# Patient Record
Sex: MALE | Race: WHITE | Employment: FULL TIME | URBAN - METROPOLITAN AREA
[De-identification: names, ages, dates, MRNs, and addresses within clinical notes are randomized per-mention and may not be internally consistent; named-entity substitution may affect disease eponyms.]

---

## 2020-02-06 ENCOUNTER — OFFICE VISIT (OUTPATIENT)
Dept: FAMILY MEDICINE CLINIC | Age: 60
End: 2020-02-06
Payer: COMMERCIAL

## 2020-02-06 VITALS
DIASTOLIC BLOOD PRESSURE: 90 MMHG | SYSTOLIC BLOOD PRESSURE: 148 MMHG | HEART RATE: 99 BPM | TEMPERATURE: 97 F | OXYGEN SATURATION: 99 % | BODY MASS INDEX: 33.98 KG/M2 | HEIGHT: 73 IN | WEIGHT: 256.4 LBS

## 2020-02-06 PROCEDURE — 99203 OFFICE O/P NEW LOW 30 MIN: CPT | Performed by: FAMILY MEDICINE

## 2020-02-06 RX ORDER — PREDNISONE 20 MG/1
20 TABLET ORAL 2 TIMES DAILY
Qty: 10 TABLET | Refills: 0 | Status: SHIPPED | OUTPATIENT
Start: 2020-02-06 | End: 2020-02-11

## 2020-02-06 RX ORDER — OMEGA-3 FATTY ACIDS/FISH OIL 300-1000MG
1 CAPSULE ORAL
Status: ON HOLD | COMMUNITY
End: 2022-09-21 | Stop reason: HOSPADM

## 2020-02-06 SDOH — HEALTH STABILITY: MENTAL HEALTH: HOW OFTEN DO YOU HAVE A DRINK CONTAINING ALCOHOL?: MONTHLY OR LESS

## 2020-02-06 ASSESSMENT — ENCOUNTER SYMPTOMS
VOMITING: 0
COUGH: 1
RHINORRHEA: 1
SHORTNESS OF BREATH: 1
NAUSEA: 0
DIARRHEA: 0

## 2020-02-06 NOTE — PATIENT INSTRUCTIONS
Acute URI  Scription sent for prednisone 20 mg twice a day for 5 days he may continue his over-the-counter medication as long as it does not have any ibuprofen or Aleve present.   Umbilical hernia without obstruction and without gangrene  Refer to Dr. Champ Hidalgo in [facility]  Bilateral hearing loss, unspecified hearing loss type  Refer to care  [facility]    See me in a few weeks for blood wor

## 2020-02-06 NOTE — PROGRESS NOTES
Subjective:      Patient ID: Divya Gonzalez is a 60 y.o. male. HPI  Onset 2 days--mod sinus-non prod cough--OTC some help-no achy ms. Hearing prob-onset 5 yrs-progr worse. Has Umb hernia. Prior to Visit Medications :  Medication Loratadine (CLARITIN PO), Sig Take by mouth, Taking? Yes, Authorizing Provider Historical Provider, MD    Medication ibuprofen (ADVIL;MOTRIN) 200 MG CAPS, Sig Take 1 capsule by mouth, Taking? Yes, Authorizing Provider Historical Provider, MD    Medication acetaminophen (TYLENOL) 325 MG tablet, Sig Take 650 mg by mouth every 6 hours as needed. Indications: OTC, Taking? , Authorizing Provider Historical Provider, MD      Past Medical History:  No date: Allergic rhinitis      Comment:  spring  No date: Chronic back pain      Comment:  Hx HNP        Review of Systems    Review of Systems   Constitutional: Negative for chills, diaphoresis and fever. HENT: Positive for congestion, postnasal drip and rhinorrhea. Eyes: Negative for visual disturbance. Respiratory: Positive for cough and shortness of breath. Cardiovascular: Negative for chest pain. Gastrointestinal: Negative for diarrhea, nausea and vomiting. Genitourinary: Negative for dysuria and hematuria. Objective:   Physical Exam      Physical Exam  HENT:      Right Ear: Tympanic membrane and ear canal normal.      Left Ear: Tympanic membrane and ear canal normal.      Mouth/Throat:      Mouth: Mucous membranes are moist.      Pharynx: Oropharynx is clear. Eyes:      Conjunctiva/sclera: Conjunctivae normal.   Cardiovascular:      Rate and Rhythm: Normal rate and regular rhythm. Heart sounds: Normal heart sounds. Pulmonary:      Effort: Pulmonary effort is normal.      Breath sounds: Normal breath sounds. Abdominal:      Comments: umb hernia   Neurological:      Mental Status: He is alert and oriented to person, place, and time. Assessment:      1. Acute URI    2.  Umbilical hernia without obstruction and

## 2020-02-10 ENCOUNTER — TELEPHONE (OUTPATIENT)
Dept: ADMINISTRATIVE | Age: 60
End: 2020-02-10

## 2020-02-12 NOTE — PROGRESS NOTES
Robin Mcclellan   1960, 60 y.o. male   [id number]       Referring Provider: Samara Yee DO   Referral Type: In an order in [address] [place] [address]    Reason for Visit: Evaluation of suspected change in hearing, tinnitus, and balance. ADULT AUDIOLOGIC EVALUATION      Robin Mcclellan is a 60 y.o. male seen today, 2/14/2020 , for an initial audiologic evaluation. AUDIOLOGIC AND OTHER PERTINENT MEDICAL HISTORY:      Robin Mcclellan noted a perceived gradual decline in hearing as he noticed he is unable to hear his dog whimpering. He states ears occasionally feel clogged which he attributes to allergies and sinuses. 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 He also states about two falls within the past year, sometimes related to imbalance and dizziness; falls and dizziness are reportedly managed by his primary care physician. Medical history is reportedly significant for allergies. No additional significant otologic or medical history was reported. Robin Mcclellan denied otalgia, aural fullness, otorrhea, history of head trauma and history of ear surgery. Date: 2/14/2020     IMPRESSIONS:      Today's results revealed symmetric sensorineural hearing loss, bilaterally. Hearing loss significant enough to create hearing difficulty in most listening situations. Discussed benefits of binaural amplification. ASSESSMENT AND FINDINGS:     Otoscopy revealed: Clear ear canals bilaterally    RIGHT EAR:  Hearing Sensitivity: Mild sloping to moderatly-severe sensorineural hearing loss. hearing loss. Speech Recognition Threshold: 30 dB HL  Word Recognition: Excellent (92%), based on NU-6 25-word list at 80 dBHL masked using recorded speech stimuli. Tympanometry: Normal peak pressure and compliance, Type A tympanogram, consistent with normal middle ear function.     LEFT EAR:  Hearing Sensitivity: Mild sloping to severe sensorineural hearing loss. Speech Recognition Threshold: 35 dB HL  Word Recognition: Good (88%), based on NU-6 25-word list at 85 dBHL masked using recorded speech stimuli. Tympanometry: Normal peak pressure and compliance, Type A tympanogram, consistent with normal middle ear function. Reliability: Good  Transducer: Inserts    See scanned audiogram dated 2/14/2020  for results. PATIENT EDUCATION:       The following items were discussed with the patient:    - Good Communication Strategies   - Hearing Loss and Hearing Aids    Educational information was shared in the After Visit Summary. RECOMMENDATIONS:                                                                                                                                                                                                                                                            The following items are recommended based on patient report and results from today's appointment:   - Continue medical follow-up with Jamal Radford DO .   - Retest hearing as medically indicated and/or sooner if a change in hearing is noted. - If desired, schedule a Hearing Aid Evaluation (HAE) appointment to discuss hearing aid options. Recommended patient contact insurance to determine possible hearing aid benefit. - Utilize \"Good Communication Strategies\" as discussed to assist in speech understanding with communication partners.        Radhika Espinal  Audiologist    Chart CC'd to: Jamal Radford DO       Degree of   Hearing Sensitivity dB Range   Within Normal Limits (WNL) 0 - 20   Mild 20 - 40   Moderate 40 - 55   Moderately-Severe 55 - 70   Severe 70 - 90   Profound 90 +

## 2020-02-14 ENCOUNTER — OFFICE VISIT (OUTPATIENT)
Dept: SURGERY | Age: 60
End: 2020-02-14
Payer: COMMERCIAL

## 2020-02-14 ENCOUNTER — PROCEDURE VISIT (OUTPATIENT)
Dept: AUDIOLOGY | Age: 60
End: 2020-02-14
Payer: COMMERCIAL

## 2020-02-14 VITALS
HEIGHT: 73 IN | DIASTOLIC BLOOD PRESSURE: 93 MMHG | HEART RATE: 94 BPM | SYSTOLIC BLOOD PRESSURE: 134 MMHG | WEIGHT: 256 LBS | BODY MASS INDEX: 33.93 KG/M2

## 2020-02-14 PROBLEM — H90.3 SENSORINEURAL HEARING LOSS, BILATERAL: Status: ACTIVE | Noted: 2020-02-14

## 2020-02-14 PROBLEM — K42.9 UMBILICAL HERNIA WITHOUT OBSTRUCTION AND WITHOUT GANGRENE: Status: ACTIVE | Noted: 2020-02-14

## 2020-02-14 PROCEDURE — 92557 COMPREHENSIVE HEARING TEST: CPT | Performed by: AUDIOLOGIST

## 2020-02-14 PROCEDURE — 92567 TYMPANOMETRY: CPT | Performed by: AUDIOLOGIST

## 2020-02-14 PROCEDURE — 99243 OFF/OP CNSLTJ NEW/EST LOW 30: CPT | Performed by: SURGERY

## 2020-02-14 NOTE — PROGRESS NOTES
New Patient [address], [address] [facility], [address] [facility], [address] [facility]  Phone: [phone]  Fax: [phone]    Alaina Bahena   YOB: 1960    Date of Visit:  2/14/2020    Anna Castillo DO    HPI:   Hernia: Patient is 60y.o. year old male seen at request of Macy Schmidt DO. Patient presents for evaluation of  umbilical hernia, incarcerated, nontender. Symptoms were first noted approximately 2 years ago. Pain is absent. There is a lump or mass present. Lump is not reducible. Pt does not have risk factors of  chronic constipation, chronic cough, difficulty urinating, chronic tobacco abuse. Pt. does not have previous history of prior hernia surgery.       Allergies   Allergen Reactions    Seasonal      Outpatient Medications Marked as Taking for the 2/14/20 encounter (Office Visit) with Sebastian Dupree MD   Medication Sig Dispense Refill    Loratadine (CLARITIN PO) Take by mouth         Past Medical History:   Diagnosis Date    Allergic rhinitis     spring    Chronic back pain     Hx HNP     Past Surgical History:   Procedure Laterality Date    COLONOSCOPY  6/12/2013    polyp,divert    HAND SURGERY Left 2012    MASTECTOMY Right     for lumps    MASTECTOMY Left      Family History   Problem Relation Age of Onset    Diabetes Mother     Cancer Father         lung CA    Heart Disease Father         CABG    Stroke Paternal Aunt      Social History     Socioeconomic History    Marital status:      Spouse name: Not on file    Number of children: Not on file    Years of education: Not on file    Highest education level: Not on file   Occupational History    Not on file   Social Needs    Financial resource strain: Not on file    Food insecurity:     Worry: Not on file     Inability: Not on file    Transportation needs:     Medical: Not on file     Non-medical: Not on file   Tobacco Use   

## 2020-02-14 NOTE — PATIENT INSTRUCTIONS
schedule a \"Hearing Aid Evaluation\" with an audiologist to discuss your lifestyle, features of hearing aid technology, and styles of hearing aids available. It is recommended that you contact your insurance company to determine if you have a hearing aid benefit, as this may dictate who you can see for these services. · Have hearing tests as your doctor suggests. They can show whether your hearing has changed. Your hearing aid may need to be adjusted. · Use other assistive devices as needed. These may include:  ? Telephone amplifiers and hearing aids that can connect to a television, stereo, radio, or microphone. ? Devices that use lights or vibrations. These alert you to the doorbell, a ringing telephone, or a baby monitor. ? Television closed-captioning. This shows the words at the bottom of the screen. Most new TVs can do this. ? TTY (text telephone). This lets you type messages back and forth on the telephone instead of talking or listening. These devices are also called TDD. When messages are typed on the keyboard, they are sent over the phone line to a receiving TTY. The message is shown on a monitor. · Use pagers, fax machines, text, and email if it is hard for you to communicate by telephone. · Try to learn a listening technique called speech-reading. It is not lip-reading. You pay attention to people's gestures, expressions, posture, and tone of voice. These clues can help you understand what a person is saying. Face the person you are talking to, and have him or her face you. Make sure the lighting is good. You need to see the other person's face clearly. · Think about counseling if you need help to adjust to your hearing loss. When should you call for help? Watch closely for changes in your health, and be sure to contact your doctor if:    · You think your hearing is getting worse. · You have new symptoms, such as dizziness or nausea.

## 2021-04-04 ENCOUNTER — HOSPITAL ENCOUNTER (EMERGENCY)
Age: 61
Discharge: HOME | End: 2021-04-04
Attending: EMERGENCY MEDICINE
Payer: COMMERCIAL

## 2021-04-04 VITALS
DIASTOLIC BLOOD PRESSURE: 75 MMHG | SYSTOLIC BLOOD PRESSURE: 139 MMHG | TEMPERATURE: 97.8 F | HEIGHT: 72 IN | WEIGHT: 256 LBS | BODY MASS INDEX: 34.67 KG/M2 | OXYGEN SATURATION: 97 % | HEART RATE: 81 BPM | RESPIRATION RATE: 14 BRPM

## 2021-04-04 DIAGNOSIS — R51.9 NONINTRACTABLE HEADACHE, UNSPECIFIED CHRONICITY PATTERN, UNSPECIFIED HEADACHE TYPE: Primary | ICD-10-CM

## 2021-04-04 LAB
A/G RATIO: 1.5 (ref 1.1–2.2)
ALBUMIN SERPL-MCNC: 4.4 G/DL (ref 3.4–5)
ALP BLD-CCNC: 117 U/L (ref 40–129)
ALT SERPL-CCNC: 39 U/L (ref 10–40)
ANION GAP SERPL CALCULATED.3IONS-SCNC: 9 MMOL/L (ref 3–16)
AST SERPL-CCNC: 31 U/L (ref 15–37)
BILIRUB SERPL-MCNC: 0.3 MG/DL (ref 0–1)
BUN BLDV-MCNC: 22 MG/DL (ref 7–20)
CALCIUM SERPL-MCNC: 9.6 MG/DL (ref 8.3–10.6)
CHLORIDE BLD-SCNC: 101 MMOL/L (ref 99–110)
CO2: 26 MMOL/L (ref 21–32)
CREAT SERPL-MCNC: 0.9 MG/DL (ref 0.8–1.3)
GFR AFRICAN AMERICAN: >60
GFR NON-AFRICAN AMERICAN: >60
GLOBULIN: 2.9 G/DL
GLUCOSE BLD-MCNC: 140 MG/DL (ref 70–99)
POTASSIUM REFLEX MAGNESIUM: 4 MMOL/L (ref 3.5–5.1)
SODIUM BLD-SCNC: 136 MMOL/L (ref 136–145)
TOTAL PROTEIN: 7.3 G/DL (ref 6.4–8.2)

## 2021-04-04 PROCEDURE — 2580000003 HC RX 258: Performed by: EMERGENCY MEDICINE

## 2021-04-04 PROCEDURE — 99283 EMERGENCY DEPT VISIT LOW MDM: CPT

## 2021-04-04 PROCEDURE — 96374 THER/PROPH/DIAG INJ IV PUSH: CPT

## 2021-04-04 PROCEDURE — 96361 HYDRATE IV INFUSION ADD-ON: CPT

## 2021-04-04 PROCEDURE — 96375 TX/PRO/DX INJ NEW DRUG ADDON: CPT

## 2021-04-04 PROCEDURE — 6360000002 HC RX W HCPCS: Performed by: EMERGENCY MEDICINE

## 2021-04-04 PROCEDURE — 80053 COMPREHEN METABOLIC PANEL: CPT

## 2021-04-04 RX ORDER — BUTALBITAL, ACETAMINOPHEN AND CAFFEINE 50; 325; 40 MG/1; MG/1; MG/1
1 TABLET ORAL EVERY 6 HOURS PRN
Qty: 30 TABLET | Refills: 0 | Status: SHIPPED | OUTPATIENT
Start: 2021-04-04

## 2021-04-04 RX ORDER — 0.9 % SODIUM CHLORIDE 0.9 %
1000 INTRAVENOUS SOLUTION INTRAVENOUS ONCE
Status: COMPLETED | OUTPATIENT
Start: 2021-04-04 | End: 2021-04-04

## 2021-04-04 RX ORDER — PROCHLORPERAZINE EDISYLATE 5 MG/ML
10 INJECTION INTRAMUSCULAR; INTRAVENOUS EVERY 6 HOURS PRN
Status: DISCONTINUED | OUTPATIENT
Start: 2021-04-04 | End: 2021-04-04 | Stop reason: HOSPADM

## 2021-04-04 RX ORDER — KETOROLAC TROMETHAMINE 30 MG/ML
15 INJECTION, SOLUTION INTRAMUSCULAR; INTRAVENOUS ONCE
Status: COMPLETED | OUTPATIENT
Start: 2021-04-04 | End: 2021-04-04

## 2021-04-04 RX ORDER — DIPHENHYDRAMINE HYDROCHLORIDE 50 MG/ML
25 INJECTION INTRAMUSCULAR; INTRAVENOUS ONCE
Status: COMPLETED | OUTPATIENT
Start: 2021-04-04 | End: 2021-04-04

## 2021-04-04 RX ADMIN — SODIUM CHLORIDE 1000 ML: 9 INJECTION, SOLUTION INTRAVENOUS at 18:49

## 2021-04-04 RX ADMIN — PROCHLORPERAZINE EDISYLATE 10 MG: 5 INJECTION INTRAMUSCULAR; INTRAVENOUS at 18:49

## 2021-04-04 RX ADMIN — KETOROLAC TROMETHAMINE 15 MG: 30 INJECTION, SOLUTION INTRAMUSCULAR at 18:49

## 2021-04-04 RX ADMIN — DIPHENHYDRAMINE HYDROCHLORIDE 25 MG: 50 INJECTION, SOLUTION INTRAMUSCULAR; INTRAVENOUS at 18:49

## 2021-04-04 ASSESSMENT — PAIN SCALES - GENERAL
PAINLEVEL_OUTOF10: 1
PAINLEVEL_OUTOF10: 1

## 2021-04-04 NOTE — ED PROVIDER NOTES
connections     Talks on phone: Not on file     Gets together: Not on file     Attends Cheondo[employer] service: Not on file     Active member of club or organization: Not on file     Attends meetings of clubs or organizations: Not on file     Relationship status: Not on file    Intimate partner violence     Fear of current or ex partner: Not on file     Emotionally abused: Not on file     Physically abused: Not on file     Forced sexual activity: Not on file   Other Topics Concern    Not on file   Social History Narrative    Not on file     Current Facility-Administered Medications   Medication Dose Route Frequency Provider Last Rate Last Admin    0.9 % sodium chloride bolus  1,000 mL Intravenous Once Ciara Novoa MD 1,000 mL/hr at 04/04/21 1849 1,000 mL at 04/04/21 1849    prochlorperazine (COMPAZINE) injection 10 mg  10 mg Intravenous Q6H PRN Ciara Novoa MD   10 mg at 04/04/21 1849     Current Outpatient Medications   Medication Sig Dispense Refill    butalbital-acetaminophen-caffeine (FIORICET, ESGIC) -40 MG per tablet Take 1 tablet by mouth every 6 hours as needed for Headaches 30 tablet 0    ibuprofen (ADVIL;MOTRIN) 200 MG CAPS Take 1 capsule by mouth      acetaminophen (TYLENOL) 325 MG tablet Take 650 mg by mouth every 6 hours as needed. Indications: OTC       Allergies   Allergen Reactions    Seasonal        REVIEW OF SYSTEMS  10 systems reviewed, pertinent positives and negatives per HPI, otherwise noted to be negative. PHYSICAL EXAM  ED Triage Vitals [04/04/21 1830]   BP Temp Temp Source Pulse Resp SpO2 Height Weight   (!) 177/95 97.8 °F (36.6 °C) Oral 84 16 96 % 6' (1.829 m) 256 lb (116.1 kg)     General appearance: Awake and alert. Cooperative. No acute distress. HENT: Normocephalic. Atraumatic. Mucous membranes are moist.  Neck: Supple. No nuchal rigidity  Eyes: PERRL. EOMI. Heart/Chest: RRR. No murmurs. Lungs: Respirations unlabored. CTAB. Good air exchange.  Speaking comfortably in full sentences. Abdomen: Soft. Non-tender. Non-distended. No rebound or guarding. Musculoskeletal: No extremity edema. No deformity. No tenderness in the extremities. All extremities neurovascularly intact. Skin: Warm and dry. No acute rashes. Neurological:   - Alert and oriented to person, place, time, situation. - CN II-XII intact. - Full and symmetric strength bilateral upper and lower extremities. - Sensation intact to light touch in all extremities. - Normal rapidly alternating movements  - Normal finger to nose. Normal heel to shin.   - Gait is normal.   Psychiatric: Mood/affect: normal      LABS  I have reviewed all labs for this visit. Results for orders placed or performed during the hospital encounter of 04/04/21   Comprehensive Metabolic Panel w/ Reflex to MG   Result Value Ref Range    Sodium 136 136 - 145 mmol/L    Potassium reflex Magnesium 4.0 3.5 - 5.1 mmol/L    Chloride 101 99 - 110 mmol/L    CO2 26 21 - 32 mmol/L    Anion Gap 9 3 - 16    Glucose 140 (H) 70 - 99 mg/dL    BUN 22 (H) 7 - 20 mg/dL    CREATININE 0.9 0.8 - 1.3 mg/dL    GFR Non-African American >60 >60    GFR African American >60 >60    Calcium 9.6 8.3 - 10.6 mg/dL    Total Protein 7.3 6.4 - 8.2 g/dL    Albumin 4.4 3.4 - 5.0 g/dL    Albumin/Globulin Ratio 1.5 1.1 - 2.2    Total Bilirubin 0.3 0.0 - 1.0 mg/dL    Alkaline Phosphatase 117 40 - 129 U/L    ALT 39 10 - 40 U/L    AST 31 15 - 37 U/L    Globulin 2.9 g/dL       RADIOLOGY  I have reviewed all radiographic studies for this visit. No orders to display        ED COURSE/MDM  Patient seen and evaluated. Old records reviewed. Labs and imaging reviewed and results discussed with patient/family to extent possible. This is a 56-year-old male who presents with complaint of headache. Vitals notable for mild hypertension otherwise reassuring. Neurological exam nonfocal.    With respect to the patient's complaint of headache:    The headache is consistent with patient's previous headaches. It is not described as worst headache of life. It was not \"thunderclap\"/maximal in intensity at onset. There are no associated neurological deficits. As such, I do not believe the headache is consistent with subarachnoid hemorrhage, dural venous sinus thrombosis, or other intracranial hemorrhage and therefore do not believe the patient would benefit from further evaluation for such. There is no nuchal rigidity or fever and I am not concerned for meningitis. Headache resolved after headache cocktail. Discharge with Rx for Fioricet PRN. Close PCP f/u in several days. Usual strict return precautions for new or worsening symptoms communicated. During the patient's ED course, the patient was given:  Medications   0.9 % sodium chloride bolus (1,000 mLs Intravenous New Bag 4/4/21 1849)   prochlorperazine (COMPAZINE) injection 10 mg (10 mg Intravenous Given 4/4/21 1849)   ketorolac (TORADOL) injection 15 mg (15 mg Intravenous Given 4/4/21 1849)   diphenhydrAMINE (BENADRYL) injection 25 mg (25 mg Intravenous Given 4/4/21 1849)        All questions were answered and the patient/family expressed understanding and agreement with the plan. PROCEDURES  None    CRITICAL CARE  N/A    CLINICAL IMPRESSION  1. Nonintractable headache, unspecified chronicity pattern, unspecified headache type        DISPOSITION   discharge     Stacey De Leon MD    Note: This chart was created using voice recognition dictation software. Efforts were made by me to ensure accuracy, however some errors may be present due to limitations of this technology and occasionally words are not transcribed correctly.         Stacey De Leon MD  04/04/21 8032

## 2021-04-04 NOTE — ED NOTES
Patient discharged with instructions, medication teaching, follow up instructions, verbalizes understanding. Ambulates from Ed without assist, gait steady.      Bettye Hernandez RN  04/04/21 2196

## 2022-09-19 ENCOUNTER — APPOINTMENT (OUTPATIENT)
Dept: CT IMAGING | Age: 62
DRG: 176 | End: 2022-09-19
Payer: COMMERCIAL

## 2022-09-19 ENCOUNTER — HOSPITAL ENCOUNTER (INPATIENT)
Age: 62
LOS: 1 days | Discharge: HOME | DRG: 176 | End: 2022-09-21
Attending: EMERGENCY MEDICINE | Admitting: INTERNAL MEDICINE
Payer: COMMERCIAL

## 2022-09-19 DIAGNOSIS — E11.9 DIABETES MELLITUS, NEW ONSET (HCC): ICD-10-CM

## 2022-09-19 DIAGNOSIS — R07.9 CHEST PAIN, UNSPECIFIED TYPE: Primary | ICD-10-CM

## 2022-09-19 DIAGNOSIS — I26.93 SINGLE SUBSEGMENTAL PULMONARY EMBOLISM WITHOUT ACUTE COR PULMONALE (HCC): ICD-10-CM

## 2022-09-19 LAB
A/G RATIO: 1.3 (ref 1.1–2.2)
ALBUMIN SERPL-MCNC: 4.4 G/DL (ref 3.4–5)
ALP BLD-CCNC: 154 U/L (ref 40–129)
ALT SERPL-CCNC: 31 U/L (ref 10–40)
ANION GAP SERPL CALCULATED.3IONS-SCNC: 12 MMOL/L (ref 3–16)
AST SERPL-CCNC: 20 U/L (ref 15–37)
BASOPHILS ABSOLUTE: 0 K/UL (ref 0–0.2)
BASOPHILS RELATIVE PERCENT: 0.4 %
BILIRUB SERPL-MCNC: <0.2 MG/DL (ref 0–1)
BILIRUBIN URINE: NEGATIVE
BLOOD, URINE: NEGATIVE
BUN BLDV-MCNC: 17 MG/DL (ref 7–20)
CALCIUM SERPL-MCNC: 10.4 MG/DL (ref 8.3–10.6)
CHLORIDE BLD-SCNC: 98 MMOL/L (ref 99–110)
CHP ED QC CHECK: YES
CLARITY: CLEAR
CO2: 23 MMOL/L (ref 21–32)
COLOR: YELLOW
CREAT SERPL-MCNC: 1.1 MG/DL (ref 0.8–1.3)
EOSINOPHILS ABSOLUTE: 0.1 K/UL (ref 0–0.6)
EOSINOPHILS RELATIVE PERCENT: 2 %
GFR AFRICAN AMERICAN: >60
GFR NON-AFRICAN AMERICAN: >60
GLUCOSE BLD-MCNC: 297 MG/DL (ref 70–99)
GLUCOSE BLD-MCNC: 526 MG/DL (ref 70–99)
GLUCOSE URINE: >=1000 MG/DL
HCT VFR BLD CALC: 45.8 % (ref 40.5–52.5)
HEMOGLOBIN: 15.3 G/DL (ref 13.5–17.5)
KETONES, URINE: NEGATIVE MG/DL
LEUKOCYTE ESTERASE, URINE: NEGATIVE
LYMPHOCYTES ABSOLUTE: 2 K/UL (ref 1–5.1)
LYMPHOCYTES RELATIVE PERCENT: 27.3 %
MCH RBC QN AUTO: 29.6 PG (ref 26–34)
MCHC RBC AUTO-ENTMCNC: 33.5 G/DL (ref 31–36)
MCV RBC AUTO: 88.3 FL (ref 80–100)
MICROSCOPIC EXAMINATION: ABNORMAL
MONOCYTES ABSOLUTE: 0.8 K/UL (ref 0–1.3)
MONOCYTES RELATIVE PERCENT: 11 %
NEUTROPHILS ABSOLUTE: 4.4 K/UL (ref 1.7–7.7)
NEUTROPHILS RELATIVE PERCENT: 59.3 %
NITRITE, URINE: NEGATIVE
PDW BLD-RTO: 13.2 % (ref 12.4–15.4)
PERFORMED ON: ABNORMAL
PH UA: 6 (ref 5–8)
PLATELET # BLD: 202 K/UL (ref 135–450)
PMV BLD AUTO: 8.2 FL (ref 5–10.5)
POTASSIUM REFLEX MAGNESIUM: 4.4 MMOL/L (ref 3.5–5.1)
PROTEIN UA: NEGATIVE MG/DL
RBC # BLD: 5.19 M/UL (ref 4.2–5.9)
SODIUM BLD-SCNC: 133 MMOL/L (ref 136–145)
SPECIFIC GRAVITY UA: 1.01 (ref 1–1.03)
TOTAL PROTEIN: 7.7 G/DL (ref 6.4–8.2)
TROPONIN: <0.01 NG/ML
TROPONIN: <0.01 NG/ML
URINE REFLEX TO CULTURE: ABNORMAL
URINE TYPE: ABNORMAL
UROBILINOGEN, URINE: 0.2 E.U./DL
WBC # BLD: 7.4 K/UL (ref 4–11)

## 2022-09-19 PROCEDURE — 71260 CT THORAX DX C+: CPT | Performed by: EMERGENCY MEDICINE

## 2022-09-19 PROCEDURE — 96374 THER/PROPH/DIAG INJ IV PUSH: CPT

## 2022-09-19 PROCEDURE — 6360000002 HC RX W HCPCS: Performed by: EMERGENCY MEDICINE

## 2022-09-19 PROCEDURE — 2580000003 HC RX 258: Performed by: EMERGENCY MEDICINE

## 2022-09-19 PROCEDURE — 84484 ASSAY OF TROPONIN QUANT: CPT

## 2022-09-19 PROCEDURE — 81003 URINALYSIS AUTO W/O SCOPE: CPT

## 2022-09-19 PROCEDURE — 96372 THER/PROPH/DIAG INJ SC/IM: CPT

## 2022-09-19 PROCEDURE — 83880 ASSAY OF NATRIURETIC PEPTIDE: CPT

## 2022-09-19 PROCEDURE — 83036 HEMOGLOBIN GLYCOSYLATED A1C: CPT

## 2022-09-19 PROCEDURE — 99285 EMERGENCY DEPT VISIT HI MDM: CPT

## 2022-09-19 PROCEDURE — 93005 ELECTROCARDIOGRAM TRACING: CPT | Performed by: EMERGENCY MEDICINE

## 2022-09-19 PROCEDURE — 80053 COMPREHEN METABOLIC PANEL: CPT

## 2022-09-19 PROCEDURE — 6360000004 HC RX CONTRAST MEDICATION: Performed by: EMERGENCY MEDICINE

## 2022-09-19 PROCEDURE — 85025 COMPLETE CBC W/AUTO DIFF WBC: CPT

## 2022-09-19 RX ORDER — MORPHINE SULFATE 2 MG/ML
2 INJECTION, SOLUTION INTRAMUSCULAR; INTRAVENOUS ONCE
Status: DISCONTINUED | OUTPATIENT
Start: 2022-09-19 | End: 2022-09-19

## 2022-09-19 RX ORDER — MORPHINE SULFATE 4 MG/ML
4 INJECTION, SOLUTION INTRAMUSCULAR; INTRAVENOUS ONCE
Status: COMPLETED | OUTPATIENT
Start: 2022-09-19 | End: 2022-09-19

## 2022-09-19 RX ORDER — 0.9 % SODIUM CHLORIDE 0.9 %
1000 INTRAVENOUS SOLUTION INTRAVENOUS ONCE
Status: COMPLETED | OUTPATIENT
Start: 2022-09-19 | End: 2022-09-19

## 2022-09-19 RX ADMIN — SODIUM CHLORIDE 1000 ML: 9 INJECTION, SOLUTION INTRAVENOUS at 22:30

## 2022-09-19 RX ADMIN — IOPAMIDOL 74 ML: 755 INJECTION, SOLUTION INTRAVENOUS at 22:23

## 2022-09-19 RX ADMIN — MORPHINE SULFATE 4 MG: 4 INJECTION, SOLUTION INTRAMUSCULAR; INTRAVENOUS at 22:33

## 2022-09-19 ASSESSMENT — ENCOUNTER SYMPTOMS
COUGH: 0
EYE PAIN: 0
SINUS PAIN: 0
ABDOMINAL PAIN: 0
BACK PAIN: 1
SHORTNESS OF BREATH: 0
CONSTIPATION: 1
EYE ITCHING: 1

## 2022-09-19 ASSESSMENT — PAIN - FUNCTIONAL ASSESSMENT: PAIN_FUNCTIONAL_ASSESSMENT: 0-10

## 2022-09-19 ASSESSMENT — PAIN SCALES - GENERAL
PAINLEVEL_OUTOF10: 9
PAINLEVEL_OUTOF10: 7
PAINLEVEL_OUTOF10: 3

## 2022-09-19 ASSESSMENT — PAIN DESCRIPTION - LOCATION: LOCATION: BACK

## 2022-09-20 ENCOUNTER — APPOINTMENT (OUTPATIENT)
Dept: VASCULAR LAB | Age: 62
DRG: 176 | End: 2022-09-20
Payer: COMMERCIAL

## 2022-09-20 PROBLEM — E11.9 NEW ONSET TYPE 2 DIABETES MELLITUS (HCC): Status: ACTIVE | Noted: 2022-09-20

## 2022-09-20 PROBLEM — I26.99 ACUTE PULMONARY EMBOLISM WITHOUT ACUTE COR PULMONALE, UNSPECIFIED PULMONARY EMBOLISM TYPE (HCC): Status: ACTIVE | Noted: 2022-09-20

## 2022-09-20 LAB
ANION GAP SERPL CALCULATED.3IONS-SCNC: 8 MMOL/L (ref 3–16)
BUN BLDV-MCNC: 15 MG/DL (ref 7–20)
CALCIUM SERPL-MCNC: 9.3 MG/DL (ref 8.3–10.6)
CHLORIDE BLD-SCNC: 103 MMOL/L (ref 99–110)
CO2: 29 MMOL/L (ref 21–32)
CREAT SERPL-MCNC: 0.8 MG/DL (ref 0.8–1.3)
EKG ATRIAL RATE: 79 BPM
EKG ATRIAL RATE: 82 BPM
EKG DIAGNOSIS: NORMAL
EKG DIAGNOSIS: NORMAL
EKG P AXIS: 56 DEGREES
EKG P AXIS: 64 DEGREES
EKG P-R INTERVAL: 166 MS
EKG P-R INTERVAL: 166 MS
EKG Q-T INTERVAL: 348 MS
EKG Q-T INTERVAL: 358 MS
EKG QRS DURATION: 82 MS
EKG QRS DURATION: 86 MS
EKG QTC CALCULATION (BAZETT): 399 MS
EKG QTC CALCULATION (BAZETT): 419 MS
EKG R AXIS: -11 DEGREES
EKG R AXIS: -11 DEGREES
EKG T AXIS: 48 DEGREES
EKG T AXIS: 61 DEGREES
EKG VENTRICULAR RATE: 79 BPM
EKG VENTRICULAR RATE: 82 BPM
ESTIMATED AVERAGE GLUCOSE: 303.4 MG/DL
GFR AFRICAN AMERICAN: >60
GFR NON-AFRICAN AMERICAN: >60
GLUCOSE BLD-MCNC: 235 MG/DL (ref 70–99)
GLUCOSE BLD-MCNC: 239 MG/DL (ref 70–99)
GLUCOSE BLD-MCNC: 261 MG/DL (ref 70–99)
GLUCOSE BLD-MCNC: 262 MG/DL (ref 70–99)
GLUCOSE BLD-MCNC: 271 MG/DL (ref 70–99)
GLUCOSE BLD-MCNC: 277 MG/DL (ref 70–99)
GLUCOSE BLD-MCNC: 284 MG/DL (ref 70–99)
HBA1C MFR BLD: 12.2 %
PERFORMED ON: ABNORMAL
POTASSIUM REFLEX MAGNESIUM: 4.7 MMOL/L (ref 3.5–5.1)
PRO-BNP: 8 PG/ML (ref 0–124)
SARS-COV-2, NAAT: NOT DETECTED
SODIUM BLD-SCNC: 140 MMOL/L (ref 136–145)

## 2022-09-20 PROCEDURE — 6370000000 HC RX 637 (ALT 250 FOR IP): Performed by: INTERNAL MEDICINE

## 2022-09-20 PROCEDURE — 93970 EXTREMITY STUDY: CPT

## 2022-09-20 PROCEDURE — 80048 BASIC METABOLIC PNL TOTAL CA: CPT

## 2022-09-20 PROCEDURE — 6360000002 HC RX W HCPCS: Performed by: INTERNAL MEDICINE

## 2022-09-20 PROCEDURE — 1200000000 HC SEMI PRIVATE

## 2022-09-20 PROCEDURE — 6360000002 HC RX W HCPCS: Performed by: NURSE PRACTITIONER

## 2022-09-20 PROCEDURE — 87635 SARS-COV-2 COVID-19 AMP PRB: CPT

## 2022-09-20 PROCEDURE — 36415 COLL VENOUS BLD VENIPUNCTURE: CPT

## 2022-09-20 PROCEDURE — 6360000002 HC RX W HCPCS: Performed by: EMERGENCY MEDICINE

## 2022-09-20 PROCEDURE — 6370000000 HC RX 637 (ALT 250 FOR IP): Performed by: EMERGENCY MEDICINE

## 2022-09-20 PROCEDURE — 2580000003 HC RX 258: Performed by: INTERNAL MEDICINE

## 2022-09-20 PROCEDURE — 6370000000 HC RX 637 (ALT 250 FOR IP)

## 2022-09-20 RX ORDER — ACETAMINOPHEN 325 MG/1
650 TABLET ORAL EVERY 6 HOURS PRN
Status: DISCONTINUED | OUTPATIENT
Start: 2022-09-20 | End: 2022-09-21 | Stop reason: HOSPADM

## 2022-09-20 RX ORDER — DEXTROSE MONOHYDRATE 100 MG/ML
INJECTION, SOLUTION INTRAVENOUS CONTINUOUS PRN
Status: DISCONTINUED | OUTPATIENT
Start: 2022-09-20 | End: 2022-09-21 | Stop reason: HOSPADM

## 2022-09-20 RX ORDER — SODIUM CHLORIDE 0.9 % (FLUSH) 0.9 %
5-40 SYRINGE (ML) INJECTION PRN
Status: DISCONTINUED | OUTPATIENT
Start: 2022-09-20 | End: 2022-09-21 | Stop reason: HOSPADM

## 2022-09-20 RX ORDER — ONDANSETRON 4 MG/1
4 TABLET, ORALLY DISINTEGRATING ORAL EVERY 8 HOURS PRN
Status: DISCONTINUED | OUTPATIENT
Start: 2022-09-20 | End: 2022-09-21 | Stop reason: HOSPADM

## 2022-09-20 RX ORDER — INSULIN GLARGINE 100 [IU]/ML
10 INJECTION, SOLUTION SUBCUTANEOUS NIGHTLY
Status: DISCONTINUED | OUTPATIENT
Start: 2022-09-20 | End: 2022-09-21 | Stop reason: HOSPADM

## 2022-09-20 RX ORDER — INSULIN LISPRO 100 [IU]/ML
0-4 INJECTION, SOLUTION INTRAVENOUS; SUBCUTANEOUS NIGHTLY
Status: DISCONTINUED | OUTPATIENT
Start: 2022-09-20 | End: 2022-09-21 | Stop reason: HOSPADM

## 2022-09-20 RX ORDER — OXYCODONE HYDROCHLORIDE 5 MG/1
10 TABLET ORAL EVERY 4 HOURS PRN
Status: DISCONTINUED | OUTPATIENT
Start: 2022-09-20 | End: 2022-09-21 | Stop reason: HOSPADM

## 2022-09-20 RX ORDER — INSULIN LISPRO 100 [IU]/ML
0-4 INJECTION, SOLUTION INTRAVENOUS; SUBCUTANEOUS
Status: DISCONTINUED | OUTPATIENT
Start: 2022-09-20 | End: 2022-09-21 | Stop reason: HOSPADM

## 2022-09-20 RX ORDER — ASPIRIN 81 MG/1
324 TABLET, CHEWABLE ORAL ONCE
Status: COMPLETED | OUTPATIENT
Start: 2022-09-20 | End: 2022-09-20

## 2022-09-20 RX ORDER — SODIUM CHLORIDE 0.9 % (FLUSH) 0.9 %
5-40 SYRINGE (ML) INJECTION EVERY 12 HOURS SCHEDULED
Status: DISCONTINUED | OUTPATIENT
Start: 2022-09-20 | End: 2022-09-21 | Stop reason: HOSPADM

## 2022-09-20 RX ORDER — ONDANSETRON 2 MG/ML
4 INJECTION INTRAMUSCULAR; INTRAVENOUS EVERY 6 HOURS PRN
Status: DISCONTINUED | OUTPATIENT
Start: 2022-09-20 | End: 2022-09-21 | Stop reason: HOSPADM

## 2022-09-20 RX ORDER — ACETAMINOPHEN 650 MG/1
650 SUPPOSITORY RECTAL EVERY 6 HOURS PRN
Status: DISCONTINUED | OUTPATIENT
Start: 2022-09-20 | End: 2022-09-21 | Stop reason: HOSPADM

## 2022-09-20 RX ORDER — METHOCARBAMOL 500 MG/1
500 TABLET, FILM COATED ORAL ONCE
Status: COMPLETED | OUTPATIENT
Start: 2022-09-20 | End: 2022-09-20

## 2022-09-20 RX ORDER — POLYETHYLENE GLYCOL 3350 17 G/17G
17 POWDER, FOR SOLUTION ORAL DAILY PRN
Status: DISCONTINUED | OUTPATIENT
Start: 2022-09-20 | End: 2022-09-21 | Stop reason: HOSPADM

## 2022-09-20 RX ORDER — ENOXAPARIN SODIUM 150 MG/ML
1 INJECTION SUBCUTANEOUS ONCE
Status: COMPLETED | OUTPATIENT
Start: 2022-09-20 | End: 2022-09-20

## 2022-09-20 RX ORDER — MORPHINE SULFATE 2 MG/ML
2 INJECTION, SOLUTION INTRAMUSCULAR; INTRAVENOUS EVERY 4 HOURS PRN
Status: DISCONTINUED | OUTPATIENT
Start: 2022-09-20 | End: 2022-09-20

## 2022-09-20 RX ORDER — ENOXAPARIN SODIUM 150 MG/ML
1 INJECTION SUBCUTANEOUS EVERY 12 HOURS
Status: DISCONTINUED | OUTPATIENT
Start: 2022-09-20 | End: 2022-09-21

## 2022-09-20 RX ORDER — SODIUM CHLORIDE 9 MG/ML
INJECTION, SOLUTION INTRAVENOUS PRN
Status: DISCONTINUED | OUTPATIENT
Start: 2022-09-20 | End: 2022-09-21 | Stop reason: HOSPADM

## 2022-09-20 RX ORDER — OXYCODONE HYDROCHLORIDE 5 MG/1
5 TABLET ORAL EVERY 4 HOURS PRN
Status: DISCONTINUED | OUTPATIENT
Start: 2022-09-20 | End: 2022-09-21 | Stop reason: HOSPADM

## 2022-09-20 RX ORDER — MORPHINE SULFATE 2 MG/ML
2 INJECTION, SOLUTION INTRAMUSCULAR; INTRAVENOUS EVERY 4 HOURS PRN
Status: DISCONTINUED | OUTPATIENT
Start: 2022-09-20 | End: 2022-09-21 | Stop reason: HOSPADM

## 2022-09-20 RX ADMIN — INSULIN LISPRO 2 UNITS: 100 INJECTION, SOLUTION INTRAVENOUS; SUBCUTANEOUS at 17:16

## 2022-09-20 RX ADMIN — NITROGLYCERIN 0.5 INCH: 20 OINTMENT TOPICAL at 00:45

## 2022-09-20 RX ADMIN — INSULIN LISPRO 2 UNITS: 100 INJECTION, SOLUTION INTRAVENOUS; SUBCUTANEOUS at 14:00

## 2022-09-20 RX ADMIN — INSULIN GLARGINE 10 UNITS: 100 INJECTION, SOLUTION SUBCUTANEOUS at 20:38

## 2022-09-20 RX ADMIN — INSULIN HUMAN 5 UNITS: 100 INJECTION, SOLUTION PARENTERAL at 00:58

## 2022-09-20 RX ADMIN — ACETAMINOPHEN 650 MG: 325 TABLET ORAL at 18:49

## 2022-09-20 RX ADMIN — ENOXAPARIN SODIUM 105 MG: 150 INJECTION SUBCUTANEOUS at 14:35

## 2022-09-20 RX ADMIN — MORPHINE SULFATE 2 MG: 2 INJECTION, SOLUTION INTRAMUSCULAR; INTRAVENOUS at 04:56

## 2022-09-20 RX ADMIN — INSULIN LISPRO 1 UNITS: 100 INJECTION, SOLUTION INTRAVENOUS; SUBCUTANEOUS at 08:31

## 2022-09-20 RX ADMIN — ENOXAPARIN SODIUM 105 MG: 150 INJECTION SUBCUTANEOUS at 00:50

## 2022-09-20 RX ADMIN — Medication 10 ML: at 20:39

## 2022-09-20 RX ADMIN — METHOCARBAMOL 500 MG: 500 TABLET ORAL at 10:53

## 2022-09-20 RX ADMIN — Medication 10 ML: at 08:31

## 2022-09-20 RX ADMIN — ASPIRIN 81 MG 324 MG: 81 TABLET ORAL at 00:46

## 2022-09-20 ASSESSMENT — PAIN SCALES - GENERAL
PAINLEVEL_OUTOF10: 3
PAINLEVEL_OUTOF10: 7
PAINLEVEL_OUTOF10: 0
PAINLEVEL_OUTOF10: 7
PAINLEVEL_OUTOF10: 0
PAINLEVEL_OUTOF10: 8
PAINLEVEL_OUTOF10: 4
PAINLEVEL_OUTOF10: 4
PAINLEVEL_OUTOF10: 8

## 2022-09-20 ASSESSMENT — PAIN DESCRIPTION - ORIENTATION
ORIENTATION: LEFT
ORIENTATION: MID
ORIENTATION: LEFT;LOWER
ORIENTATION: LEFT;ANTERIOR

## 2022-09-20 ASSESSMENT — PAIN - FUNCTIONAL ASSESSMENT
PAIN_FUNCTIONAL_ASSESSMENT: PREVENTS OR INTERFERES SOME ACTIVE ACTIVITIES AND ADLS
PAIN_FUNCTIONAL_ASSESSMENT: PREVENTS OR INTERFERES SOME ACTIVE ACTIVITIES AND ADLS

## 2022-09-20 ASSESSMENT — PAIN DESCRIPTION - LOCATION
LOCATION: CHEST
LOCATION: CHEST
LOCATION: CHEST;SHOULDER
LOCATION: CHEST
LOCATION: HEAD
LOCATION: CHEST

## 2022-09-20 ASSESSMENT — PAIN DESCRIPTION - DESCRIPTORS
DESCRIPTORS: ACHING;DISCOMFORT
DESCRIPTORS: ACHING;SORE
DESCRIPTORS: SORE;ACHING
DESCRIPTORS: ACHING

## 2022-09-20 ASSESSMENT — PAIN DESCRIPTION - FREQUENCY: FREQUENCY: CONTINUOUS

## 2022-09-20 ASSESSMENT — PAIN DESCRIPTION - PAIN TYPE: TYPE: ACUTE PAIN

## 2022-09-20 NOTE — H&P
Hospital Medicine History & Physical      PCP: Jeffrey 173, DO    Date of Admission: 9/19/2022    Date of Service: Pt seen/examined on 9/20/22 and admitted to inpatient with expected LOS greater than two midnights due to medical therapy. Chief Complaint:  left sided chest pain      History Of Present Illness:  57 y.o. male with past medical history significant for chronic back pain, seasonal allergies, obesity. Presented to [facility] with complaint of left-sided chest pain. Patient states the pain started overnight, and is still present. The pain starts in his left lower ribs, and wraps around to his shoulder blade. States it feels like he is being \"speared\" from the back to the front. At worst it is rated 8-9/10, and is rated this at time of exam.  It is not associated with dyspnea at rest or with exertion. Movement and deep breaths aggravate the pain. There are no alleviating factors. He denies n/v/d, dysuria, fever, chills, headache. He states he has never had pain like this before. Prior to this exam CT chest had resulted, and showed an acute, nonocclusive PE in the right lower lobe. Patient denies any recent travel, surgery, prolonged period of immobility. Denies personal and family history of clotting issues. 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 Past Medical History:          Diagnosis Date    Allergic rhinitis     spring    Chronic back pain     Hx HNP       Past Surgical History:          Procedure Laterality Date    COLONOSCOPY  6/12/2013    polyp,divert    HAND SURGERY Left 2012    MASTECTOMY Right     for lumps    MASTECTOMY Left        Medications Prior to Admission:      Prior to Admission medications    Medication Sig Start Date End Date Taking?  Authorizing Provider   butalbital-acetaminophen-caffeine (FIORICET, ESGIC) -18 MG per tablet Take 1 tablet by mouth every 6 hours as needed for Headaches 4/4/21   Jhonathan Michelle Sabrina Vergara MD   ibuprofen (ADVIL;MOTRIN) 200 MG CAPS Take 1 capsule by mouth    Historical Provider, MD   acetaminophen (TYLENOL) 325 MG tablet Take 650 mg by mouth every 6 hours as needed. Indications: OTC    Historical Provider, MD       Allergies:  Seasonal    Social History:      The patient currently lives with his wife    TOBACCO:   reports that he has never smoked. He has never used smokeless tobacco.  ETOH:   reports current alcohol use. E-cigarette/Vaping       Questions Responses    E-cigarette/Vaping Use     Start Date     Passive Exposure     Quit Date     Counseling Given     Comments             Family History:      Reviewed and negative in regards to presenting illness/complaint. Problem Relation Age of Onset    Diabetes Mother     Cancer Father         lung CA    Heart Disease Father         CABG    Stroke Paternal Aunt        REVIEW OF SYSTEMS COMPLETED:   Pertinent positives as noted in the HPI. All other systems reviewed and negative. PHYSICAL EXAM PERFORMED:    /74   Pulse 68   Temp 98.1 °F (36.7 °C) (Oral)   Resp 18   Ht 6' 1\" (1.854 m)   Wt 235 lb (106.6 kg)   SpO2 95%   BMI 31.00 kg/m²     General appearance: No apparent distress, appears stated age and cooperative. HEENT: Normal cephalic, atraumatic without obvious deformity. Pupils equal, round, and reactive to light. Extra ocular muscles intact. Conjunctivae/corneas clear. Neck: Supple, with full range of motion. No jugular venous distention. Trachea midline. Respiratory: Normal respiratory effort. Clear to auscultation, bilaterally without Rales/Wheezes/Rhonchi. Cardiovascular: Regular rate and rhythm with normal S1/S2 without murmurs, rubs or gallops. Abdomen: Soft, non-tender, non-distended with normal bowel sounds. Musculoskeletal: No clubbing, cyanosis or edema bilaterally. Full range of motion without deformity. Skin: Skin color, texture, turgor normal. No rashes or lesions.   Neurologic: Neurovascularly intact without any focal sensory/motor deficits. Cranial nerves: II-XII intact, grossly non-focal.  Psychiatric: Alert and oriented, thought content appropriate, normal insight  Capillary Refill: Brisk,3 seconds, normal  Peripheral Pulses: +2 palpable, equal bilaterally       Labs:     Recent Labs     09/19/22 2021   WBC 7.4   HGB 15.3   HCT 45.8        Recent Labs     09/19/22 2021 09/20/22  0640   * 140   K 4.4 4.7   CL 98* 103   CO2 23 29   BUN 17 15   CREATININE 1.1 0.8   CALCIUM 10.4 9.3     Recent Labs     09/19/22 2021   AST 20   ALT 31   BILITOT <0.2   ALKPHOS 154*     No results for input(s): INR in the last 72 hours. Recent Labs     09/19/22 2021 09/19/22  2325   TROPONINI <0.01 <0.01       Urinalysis:      Lab Results   Component Value Date/Time    NITRU Negative 09/19/2022 08:21 PM    BLOODU Negative 09/19/2022 08:21 PM    SPECGRAV 1.010 09/19/2022 08:21 PM    GLUCOSEU >=1000 09/19/2022 08:21 PM       Radiology:     EKG:  I have reviewed the EKG with the following interpretation: sinus rhythm    VL Extremity Venous Bilateral         CT CHEST PULMONARY EMBOLISM W CONTRAST   Final Result   1. Focal nonocclusive segmental to subsegmental pulmonary embolism in the   right lower lobe. 2. Minimal dependent ground-glass changes likely related to atelectasis. 3. No spiculated lung mass, consolidation or pleural effusion. 4. No lymphadenopathy. 5. Fatty liver infiltration.              Consults:    IP CONSULT TO HOSPITALIST  IP CONSULT TO DIABETES EDUCATOR  IP CONSULT TO DIETITIAN  IP CONSULT TO HEM/ONC    ASSESSMENT & PLAN:    Active Hospital Problems    Diagnosis Date Noted    Acute pulmonary embolism without acute cor pulmonale, unspecified pulmonary embolism type ([facility] [address] [I26.99] 09/20/2022     Priority: Medium    New onset type 2 diabetes mellitus ([facility] [address] [E11.9] 09/20/2022     Priority: Medium     Chest pain  -Differentials include ACS, GI, musculoskeletal,   -Trop negative x2  -EKG non-acute  -CT chest w/ evicence of PE    Nonocclusive segmental to subsegmental PE in RLL  -First occurrence  -Per CT chest this admission  -Lovenox @ 1mg/kg  -Will likely transition to eliquis, which he will take for at least 3 months  -BLE dopplers with no evidence of DVT  -Echo ordered, pending  -PE likely unprovoked: pt denies recent surgery, injury, trauma, prolonged travel. Diabetes type II, uncontrolled  -New diagnosis  -A1C 12.2  -Basal bolus dose and sliding scale insulin, monitor and adjust as needed  -Carb control diet  -Patient and sister aware of this diagnosis. Discussed with them that patient would receive education on this diagnosis. They are aware treatment may include insulin injections at home. Given A1C of 12.2, which was not available at time of discussion, patient will discharge with insulin.  -Will arrange for pt to be educated on how do perform blood sugars and give himself insulin    Chronic pain  -PDMP reviewed    Obesity  -Body mass index 31  -This complicates assessment and treatment. Placing patient at risk for multiple co-morbidities as well as early death and contributing to the patient's presentation  -Counseled on weight loss      DVT Prophylaxis: lovenox, 1mg/kg  Diet: ADULT DIET; Regular; 4 carb choices (60 gm/meal)  Code Status: Full Code    PT/OT Eval Status: not indicated    Dispo - 1-2 days, pending course       CAROLINA Gusman - CNP    Thank you Rizwana Merchant DO for the opportunity to be involved in this patient's care. If you have any questions or concerns please feel free to contact me at [phone].

## 2022-09-20 NOTE — ED PROVIDER NOTES
[address] [facility] [address] ENCOUNTER      Pt Name: Taz Woodson  MRN: [id number]  [place] 1960  Date of evaluation: 9/19/2022  Provider: Angela Valdivia MD    [address] [place] [address]       Chief Complaint   Patient presents with    Chest Pain     For the last 3 days; patient rates pain 9/10, nonverbal pain ques match 4/10; patient reports difficulty sleeping but reports it is more so due to the back pain than the chest \"discomfort\"    Back Pain         HISTORY OF PRESENT ILLNESS   (Location/Symptom, Timing/Onset,Context/Setting, Quality, Duration, Modifying Factors, Severity)  Note limiting factors. Taz Woodson is a 57 y.o. male who presents to the emergency department for left sided chest and back pain started 3 days ago below the left shoulder blade wrapping around to the front but he also feels the pain going straight through from back to front. Pain has been constant and it has been worsening. Nursing notes were reviewed. REVIEW OF SYSTEMS    (2-9 systems for level 4, 10 or more for level 5)     Review of Systems   Constitutional:  Positive for appetite change and unexpected weight change. Negative for fever. HENT:  Positive for sneezing. Negative for sinus pain. Eyes:  Positive for itching. Negative for pain. Respiratory:  Negative for cough and shortness of breath. Cardiovascular:  Negative for chest pain. Gastrointestinal:  Positive for constipation. Negative for abdominal pain. Endocrine: Positive for polydipsia and polyphagia. Negative for polyuria. Genitourinary:  Positive for frequency. Negative for flank pain. Musculoskeletal:  Positive for back pain. Neurological:  Negative for light-headedness.        PAST MEDICAL HISTORY     Past Medical History:   Diagnosis Date    Allergic rhinitis     spring    Chronic back pain     Hx HNP         SURGICALHISTORY       Past Surgical History:   Procedure Laterality Date    COLONOSCOPY  6/12/2013 polyp,divert    HAND SURGERY Left 2012    MASTECTOMY Right     for lumps    MASTECTOMY Left          CURRENT MEDICATIONS       Current Discharge Medication List        CONTINUE these medications which have NOT CHANGED    Details   butalbital-acetaminophen-caffeine (FIORICET, ESGIC) -40 MG per tablet Take 1 tablet by mouth every 6 hours as needed for Headaches  Qty: 30 tablet, Refills: 0      ibuprofen (ADVIL;MOTRIN) 200 MG CAPS Take 1 capsule by mouth      acetaminophen (TYLENOL) 325 MG tablet Take 650 mg by mouth every 6 hours as needed. Indications: OTC             ALLERGIES     Seasonal    FAMILY HISTORY       Family History   Problem Relation Age of Onset    Diabetes Mother     Cancer Father         lung CA    Heart Disease Father         CABG    Stroke Paternal Aunt           SOCIAL HISTORY       Social History     Socioeconomic History    Marital status:      Spouse name: None    Number of children: None    Years of education: None    Highest education level: None   Tobacco Use    Smoking status: Never    Smokeless tobacco: Never   Substance and Sexual Activity    Alcohol use: Yes    Drug use: No       SCREENINGS    Howell Coma Scale  Eye Opening: Spontaneous  Best Verbal Response: Oriented  Best Motor Response: Obeys commands  Howell Coma Scale Score: 15        PHYSICAL EXAM    (up to 7 for level 4, 8 or more for level 5)     ED Triage Vitals [09/19/22 2001]   BP Temp Temp Source Heart Rate Resp SpO2 Height Weight   (!) 155/97 98.7 °F (37.1 °C) Oral 89 17 95 % 6' 1\" (1.854 m) 235 lb (106.6 kg)       Physical Exam  Vitals and nursing note reviewed. Constitutional:       Appearance: Normal appearance. He is well-developed. He is not ill-appearing. HENT:      Head: Normocephalic and atraumatic. Right Ear: External ear normal.      Left Ear: External ear normal.      Nose: Nose normal.   Eyes:      General: No scleral icterus. Right eye: No discharge.          Left Final Result   1. Focal nonocclusive segmental to subsegmental pulmonary embolism in the   right lower lobe. 2. Minimal dependent ground-glass changes likely related to atelectasis. 3. No spiculated lung mass, consolidation or pleural effusion. 4. No lymphadenopathy. 5. Fatty liver infiltration. VL Extremity Venous Bilateral    (Results Pending)         ED BEDSIDE ULTRASOUND:   Performed by ED Physician - none    LABS:  Labs Reviewed   COMPREHENSIVE METABOLIC PANEL W/ REFLEX TO MG FOR LOW K - Abnormal; Notable for the following components:       Result Value    Sodium 133 (*)     Chloride 98 (*)     Glucose 526 (*)     Alkaline Phosphatase 154 (*)     All other components within normal limits   URINALYSIS WITH REFLEX TO CULTURE - Abnormal; Notable for the following components:    Glucose, Ur >=1000 (*)     All other components within normal limits   POCT GLUCOSE - Abnormal; Notable for the following components:    POC Glucose 297 (*)     All other components within normal limits   POCT GLUCOSE - Abnormal; Notable for the following components:    POC Glucose 277 (*)     All other components within normal limits   POCT GLUCOSE - Abnormal; Notable for the following components:    POC Glucose 262 (*)     All other components within normal limits   POCT GLUCOSE - Normal   COVID-19, RAPID   CBC WITH AUTO DIFFERENTIAL   TROPONIN   TROPONIN   BRAIN NATRIURETIC PEPTIDE   HEMOGLOBIN T2J   BASIC METABOLIC PANEL W/ REFLEX TO MG FOR LOW K   POCT GLUCOSE   POCT GLUCOSE   POCT GLUCOSE       All other labs were within normal range or not returned as of this dictation.     EMERGENCY DEPARTMENT COURSE and DIFFERENTIAL DIAGNOSIS/MDM:   Vitals:    Vitals:    09/20/22 0258 09/20/22 0303 09/20/22 0413 09/20/22 0456   BP:  107/78 126/84    Pulse: 75 74 81    Resp: 18 18 18 20   Temp:  98.8 °F (37.1 °C) 97.5 °F (36.4 °C)    TempSrc:   Oral    SpO2: 96% 94% 98%    Weight:       Height:           Adult male who comes in with left-sided chest pain although this is reproducible pain the patient is hypertensive. He has symptoms concerning for diabetes and his initial laboratory studies were reviewed confirms diagnosis of diabetes. I am concerned that this patient also with his age and elevated BMI is at increased risk of acute cardiac disease in the laboratory studies and EKG ordered. He is placed on cardiac blood pressure and pulse oximetry monitoring. A CT pulmonary embolism study is ordered as he does mention a pleuritic competent of this pain. Laboratory studies as mentioned above shows diabetes for which he is given a liter bolus of normal saline. He does have improvement of his glucose and he is given subcutaneous insulin 5units where this. Patient's troponin x2 is negative. His EKG shows no acute ischemic findings. CT scan shows surprisingly a right-sided pulmonary embolism. Patient is given enoxaparin. Given his new onset diabetes in the setting of pulmonary embolism and his blood pressure elevation I believe it is in the patient's best interest to be admitted to the hospital for further care. Patient was initially given morphine for his pain without any significant improvement he is therefore given nitroglycerin paste and aspirin. This patient will need to be admitted to the hospital for further care. I discussed all of our findings and a treatment plan of the patient and his wife they expressed understanding and they are agreeable with the treatment plan. A consult is placed to the hospitalist on duty, case discussed via perfect serve. Patient was admitted to the hospitalist service. Is this patient to be included in the SEP-1 Core Measure due to severe sepsis or septic shock? No   Exclusion criteria - the patient is NOT to be included for SEP-1 Core Measure due to:   Infection is not suspected     CRITICAL CARE TIME   Total Critical Care time was 40 minutes, excluding separately reportable procedures. There was a high probability of clinically significant/life threatening deterioration in the patient's condition which required my urgent intervention. CONSULTS:  IP CONSULT TO HOSPITALIST  IP CONSULT TO DIABETES EDUCATOR    PROCEDURES:       Procedures    FINAL IMPRESSION      1. Chest pain, unspecified type    2. Diabetes mellitus, new onset ([facility]    3. Single subsegmental pulmonary embolism without acute cor pulmonale ([facility] [address]          DISPOSITION/PLAN   DISPOSITION Admitted 09/20/2022 01:13:01 AM      PATIENT REFERREDTO:  No follow-up provider specified.     DISCHARGEMEDICATIONS:  Current Discharge Medication List             (Please note that portions of this note were completed with a voice recognition program.  Efforts were made to edit the dictations but occasionally words are mis-transcribed.)    Destiny Bauer MD (electronically signed)  Attending Emergency Physician  I am the primary physician of record          Destiny Bauer MD  09/20/22 6403

## 2022-09-20 NOTE — PLAN OF CARE
Problem: Pain  Goal: Verbalizes/displays adequate comfort level or baseline comfort level  Outcome: Progressing   Pain assessed using a 0-10 scale. Patient able to verbalize need for PRN medications.

## 2022-09-20 NOTE — ED NOTES
Report called to A2 RN, all questions addressed. Pt aware of admission status with no questions verbalized. Pt states pain is improving but is still present.  Pt leaves ED in [facility], in care of floor DEIDRA Givens RN  09/20/22 3620

## 2022-09-20 NOTE — PROGRESS NOTES
Writer verbalized and demonstrated administration of insulin subQ injection.     Patient verbalized understanding and able to teach back successful cleaning of site and administration of insulin

## 2022-09-20 NOTE — PROGRESS NOTES
Diabetes education printed from [employer] and given to patient Advancement-Rotation Flap Text: The defect edges were debeveled with a #15 scalpel blade.  Given the location of the defect, shape of the defect and the proximity to free margins an advancement-rotation flap was deemed most appropriate.  Using a sterile surgical marker, an appropriate flap was drawn incorporating the defect and placing the expected incisions within the relaxed skin tension lines where possible. The area thus outlined was incised deep to adipose tissue with a #15 scalpel blade.  The skin margins were undermined to an appropriate distance in all directions utilizing iris scissors.

## 2022-09-20 NOTE — ED NOTES
Report taken from [facility], [facility]  09/20/22 4157

## 2022-09-21 ENCOUNTER — TELEPHONE (OUTPATIENT)
Dept: FAMILY MEDICINE CLINIC | Age: 62
End: 2022-09-21

## 2022-09-21 VITALS
HEIGHT: 73 IN | WEIGHT: 235 LBS | HEART RATE: 84 BPM | RESPIRATION RATE: 18 BRPM | SYSTOLIC BLOOD PRESSURE: 135 MMHG | OXYGEN SATURATION: 95 % | DIASTOLIC BLOOD PRESSURE: 86 MMHG | BODY MASS INDEX: 31.14 KG/M2 | TEMPERATURE: 98.3 F

## 2022-09-21 LAB
GLUCOSE BLD-MCNC: 218 MG/DL (ref 70–99)
GLUCOSE BLD-MCNC: 222 MG/DL (ref 70–99)
GLUCOSE BLD-MCNC: 240 MG/DL (ref 70–99)
LV EF: 58 %
LVEF MODALITY: NORMAL
PERFORMED ON: ABNORMAL

## 2022-09-21 PROCEDURE — 2580000003 HC RX 258: Performed by: INTERNAL MEDICINE

## 2022-09-21 PROCEDURE — 6360000002 HC RX W HCPCS: Performed by: INTERNAL MEDICINE

## 2022-09-21 PROCEDURE — 93306 TTE W/DOPPLER COMPLETE: CPT

## 2022-09-21 PROCEDURE — 6370000000 HC RX 637 (ALT 250 FOR IP)

## 2022-09-21 PROCEDURE — C8929 TTE W OR WO FOL WCON,DOPPLER: HCPCS

## 2022-09-21 PROCEDURE — 6370000000 HC RX 637 (ALT 250 FOR IP): Performed by: INTERNAL MEDICINE

## 2022-09-21 RX ORDER — METHOCARBAMOL 500 MG/1
500 TABLET, FILM COATED ORAL 4 TIMES DAILY
Qty: 40 TABLET | Refills: 0 | Status: SHIPPED | OUTPATIENT
Start: 2022-09-21 | End: 2022-10-01

## 2022-09-21 RX ORDER — METHOCARBAMOL 500 MG/1
500 TABLET, FILM COATED ORAL 4 TIMES DAILY
Status: DISCONTINUED | OUTPATIENT
Start: 2022-09-21 | End: 2022-09-21 | Stop reason: HOSPADM

## 2022-09-21 RX ORDER — INSULIN GLARGINE 100 [IU]/ML
10 INJECTION, SOLUTION SUBCUTANEOUS NIGHTLY
Qty: 10 ML | Refills: 3 | Status: SHIPPED | OUTPATIENT
Start: 2022-09-21 | End: 2022-09-23

## 2022-09-21 RX ORDER — BLOOD-GLUCOSE METER
1 KIT MISCELLANEOUS DAILY
Qty: 1 KIT | Refills: 0 | Status: SHIPPED | OUTPATIENT
Start: 2022-09-21

## 2022-09-21 RX ORDER — INSULIN LISPRO 100 [IU]/ML
INJECTION, SOLUTION INTRAVENOUS; SUBCUTANEOUS
Qty: 1 ADJUSTABLE DOSE PRE-FILLED PEN SYRINGE | Refills: 0 | Status: SHIPPED | OUTPATIENT
Start: 2022-09-21

## 2022-09-21 RX ORDER — INSULIN GLARGINE 100 [IU]/ML
15 INJECTION, SOLUTION SUBCUTANEOUS NIGHTLY
Qty: 5 ADJUSTABLE DOSE PRE-FILLED PEN SYRINGE | Refills: 3 | Status: SHIPPED | OUTPATIENT
Start: 2022-09-21 | End: 2022-10-21 | Stop reason: SDUPTHER

## 2022-09-21 RX ADMIN — Medication 10 ML: at 09:05

## 2022-09-21 RX ADMIN — ENOXAPARIN SODIUM 105 MG: 150 INJECTION SUBCUTANEOUS at 00:35

## 2022-09-21 RX ADMIN — APIXABAN 10 MG: 5 TABLET, FILM COATED ORAL at 09:04

## 2022-09-21 RX ADMIN — INSULIN LISPRO 1 UNITS: 100 INJECTION, SOLUTION INTRAVENOUS; SUBCUTANEOUS at 11:59

## 2022-09-21 RX ADMIN — INSULIN LISPRO 1 UNITS: 100 INJECTION, SOLUTION INTRAVENOUS; SUBCUTANEOUS at 09:06

## 2022-09-21 RX ADMIN — METHOCARBAMOL 500 MG: 500 TABLET ORAL at 11:57

## 2022-09-21 ASSESSMENT — PAIN DESCRIPTION - DESCRIPTORS
DESCRIPTORS: ACHING;SORE
DESCRIPTORS: SORE

## 2022-09-21 ASSESSMENT — PAIN DESCRIPTION - FREQUENCY: FREQUENCY: CONTINUOUS

## 2022-09-21 ASSESSMENT — PAIN SCALES - GENERAL
PAINLEVEL_OUTOF10: 2
PAINLEVEL_OUTOF10: 0
PAINLEVEL_OUTOF10: 3

## 2022-09-21 ASSESSMENT — PAIN DESCRIPTION - ONSET: ONSET: ON-GOING

## 2022-09-21 ASSESSMENT — PAIN DESCRIPTION - PAIN TYPE: TYPE: ACUTE PAIN

## 2022-09-21 ASSESSMENT — PAIN DESCRIPTION - LOCATION
LOCATION: BACK;CHEST
LOCATION: BACK;CHEST

## 2022-09-21 ASSESSMENT — PAIN DESCRIPTION - ORIENTATION
ORIENTATION: LEFT;MID
ORIENTATION: MID;LEFT

## 2022-09-21 NOTE — CARE COORDINATION
CASE MANAGEMENT INITIAL ASSESSMENT      Reviewed chart and completed assessment with patient:at bedside w spouse  Family present: yes  Explained Case Management role/services. yes    Primary contact information:    Health Care Decision Maker :   Primary Decision Maker: Fran Kenney Spouse - [phone]          Can this person be reached and be able to respond quickly, such as within a few minutes or hours? Yes  Who would be your back-up decision maker? Name none named  Phone Number:    Admit date/status:9/20 IP  Diagnosis:PE, DM   Is this a Readmission?:  No      Insurance:BCBS   Precert required for SNF: Yes       3 night stay required: No    Living arrangements, Adls, care needs, prior to admission:Lives w spouse. IPTA- no DME or services. Pt drives     Durable Medical Equipment at home:  Walker__Cane__RTS__ BSC__Shower Chair__  02__ HHN__ CPAP__  BiPap__  Hospital Bed__ W/C___ Other_none____    Services in the home and/or outpatient, prior to admission:none    Current PCP: Dannie Núñez, DO       States this PCP is retiring soon. Discussed need for PCP follow up. Provided w [address] for follow up in case any difficulty with post DC follow up appt. Medications: Prescription coverage? Yes Will pt require financial assistance with medications No     Pt will likely have new scripts for Eliquis and insulin, also needs glucometer. Discussed w Mick Collier and Dr Jake Martinez, and pt and spouse- suggest any DC scripts go thru [address] just in case coverage and copays may prove to be  a burden and limit compliance. Pt demo shared w [address] in anticipation of need.      Transportation needs: family         PT/OT recs:none    Hospital Exemption Notification (HEN):na    Barriers to discharge:new scripts- see above    Plan/comments:plan home w spouse and [facility] pharm for scripts     ECOC on chart for MD nemo Hogan, RN

## 2022-09-21 NOTE — PROGRESS NOTES
Genice Serum paged \"FYI: ECHO resulted. Thanks! \" Awaiting response. Will continue to monitor.  Electronically signed by Fate Cushing, RN on 9/21/22 at 12:12 PM EDT

## 2022-09-21 NOTE — TELEPHONE ENCOUNTER
[facility] Trans[facility] Initial Follow Up Call    Outreach made within 2 business days of discharge: Yes    Patient: Dorice Aase Patient : 1960   MRN: [id number]  Reason for Admission: There are no discharge diagnoses documented for the most recent discharge. Discharge Date: 22       Spoke with: patient    Discharge department/facility: [facility]    Non-face-to-face services provided:  Scheduled appointment with PCP-22 with NP since PCP and [facility] unavailable within time frame  Obtained and reviewed discharge summary and/or continuity of care documents    TCM Interactive Patient Contact:  Was patient able to fill all prescriptions: Yes  Was patient instructed to bring all medications to the follow-up visit: Yes  Is patient taking all medications as directed in the discharge summary?  Yes  Does patient understand their discharge instructions: Yes  Does patient have questions or concerns that need addressed prior to 7-14 day follow up office visit: no    Scheduled appointment with PCP within 7-14 days    Follow Up  Future Appointments   Date Time Provider Alex Sutherland   2022  1:00 PM Wero Hill [address] [place] [address], RN

## 2022-09-21 NOTE — CARE COORDINATION
Has DC order. Home w spouse and NN. Will get Eliquis thru [facility]- found to be less costly for rest of scripts and likely glucometer thru [facility]. Pt and spouse aware. RN updated.   Shonda Soares RN

## 2022-09-21 NOTE — DISCHARGE SUMMARY
Hospital Medicine Discharge Summary    Patient ID: [facility]      Patient's PCP: Lalit Muniz DO    Admit Date: 9/19/2022     Discharge Date:  9/21/22    Admitting Provider: Bill Blandon MD     Discharge Provider: CAROLINA Julien - CNP     Discharge Diagnoses: Active Hospital Problems    Diagnosis     Acute pulmonary embolism without acute cor pulmonale, unspecified pulmonary embolism type (HCC) [I26.99]      Priority: Medium    New onset type 2 diabetes mellitus ([facility] [E11.9]      Priority: Medium       The patient was seen and examined on day of discharge and this discharge summary is in conjunction with any daily progress note from day of discharge. Hospital Course: 57 y.o. male with past medical history significant for chronic back pain, seasonal allergies, obesity. Presented to [facility] with complaint of left-sided chest pain. Patient states the pain started overnight, and is still present. The pain starts in his left lower ribs, and wraps around to his shoulder blade. States it feels like he is being \"speared\" from the back to the front. At worst it is rated 8-9/10, and is rated this at time of exam.  It is not associated with dyspnea at rest or with exertion. Movement and deep breaths aggravate the pain. There are no alleviating factors. He denies n/v/d, dysuria, fever, chills, headache. He states he has never had pain like this before. Prior to this exam CT chest had resulted, and showed an acute, nonocclusive PE in the right lower lobe. Patient denies any recent travel, surgery, prolonged period of immobility. Denies personal and family history of clotting issues. 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 Chest pain. Differentials include ACS, GI, musculoskeletal. ACS ruled out: Trop negative x2, EKG non-acute. CT chest w/ evidence of PE.  Suspect a musculoskeletal component given it is reproducible on exam. Pt will discharge with course of muscle relaxants. He should avoid NSAID use while on DOAC     Nonocclusive segmental to subsegmental PE in RLL. First occurrence. Found on CT  CT chest this admission. Initially treated with Lovenox @ 1mg/kg, transitioned to eliquis at discharge. Will take 10mg BID x1 week, then 5mg BID. He will need to be on this for at least 3 months, possibly longer. Can follow with hem/onc to determine length of treatment, and if any hypercoag workup is felt needed. BLE dopplers with no evidence of DVT. Echo within normal EF, no evidence of strain. PE likely unprovoked: pt denies recent surgery, injury, trauma, prolonged travel. Diabetes type II, uncontrolled. New diagnosis this admission. A1C 12.2. Patient and family aware of this diagnosis. Discussed with them that patient would receive education on this diagnosis. He will discharge with basal bolus dose, and sliding scale insulin, as well as a glucometer. Pt was educated during admission on how to check sugars and administer insulin. Recommend repeat A1C in 3-4 months with PCP     Chronic pain, stable. PDMP reviewed     Obesity. Body mass index 31. This complicates assessment and treatment. Placing patient at risk for multiple co-morbidities as well as early death and contributing to the patient's presentation. Counseled on weight loss    Physical Exam Performed:     /86   Pulse 84   Temp 98.3 °F (36.8 °C) (Oral)   Resp 18   Ht 6' 1\" (1.854 m)   Wt 235 lb (106.6 kg)   SpO2 95%   BMI 31.00 kg/m²       General appearance: resting in chair, comfortable. No apparent distress, appears stated age and cooperative. HEENT: Normal cephalic, atraumatic without obvious deformity. Pupils equal, round, and reactive to light. Extra ocular muscles intact. Conjunctivae/corneas clear. Glasses   Neck: Supple, with full range of motion. No jugular venous distention. Trachea midline. Respiratory: Normal respiratory effort.  Clear to auscultation, bilaterally without Rales/Wheezes/Rhonchi. Room air  Cardiovascular: Regular rate and rhythm with normal S1/S2 without murmurs, rubs or gallops. Abdomen: Soft, non-tender, non-distended with normal bowel sounds. Musculoskeletal: No clubbing, cyanosis or edema bilaterally. Full range of motion without deformity. Skin: Skin color, texture, turgor normal. No rashes or lesions. Neurologic: Neurovascularly intact without any focal sensory/motor deficits. Cranial nerves: II-XII intact, grossly non-focal.  Psychiatric: Alert and oriented, thought content appropriate, normal insight  Capillary Refill: Brisk,< 3 seconds   Peripheral Pulses: +2 palpable, equal bilaterally       Labs: For convenience and continuity at follow-up the following most recent labs are provided:      CBC:    Lab Results   Component Value Date/Time    WBC 7.4 09/19/2022 08:21 PM    HGB 15.3 09/19/2022 08:21 PM    HCT 45.8 09/19/2022 08:21 PM     09/19/2022 08:21 PM       Renal:    Lab Results   Component Value Date/Time     09/20/2022 06:40 AM    K 4.7 09/20/2022 06:40 AM     09/20/2022 06:40 AM    CO2 29 09/20/2022 06:40 AM    BUN 15 09/20/2022 06:40 AM    CREATININE 0.8 09/20/2022 06:40 AM    CALCIUM 9.3 09/20/2022 06:40 AM         Significant Diagnostic Studies    Radiology:   VL Extremity Venous Bilateral         CT CHEST PULMONARY EMBOLISM W CONTRAST   Final Result   1. Focal nonocclusive segmental to subsegmental pulmonary embolism in the   right lower lobe. 2. Minimal dependent ground-glass changes likely related to atelectasis. 3. No spiculated lung mass, consolidation or pleural effusion. 4. No lymphadenopathy. 5. Fatty liver infiltration. Consults:     IP CONSULT TO HOSPITALIST  IP CONSULT TO DIABETES EDUCATOR  IP CONSULT TO DIETITIAN  IP CONSULT TO HEM/ONC    Disposition:  home     Condition at Discharge: Stable    Discharge Instructions/Follow-up:  PCP within 1 week.      Code Status:  Full Code     Activity: activity as tolerated    Diet: diabetic diet      Discharge Medications:     Current Discharge Medication List             Details   apixaban (ELIQUIS) 5 MG TABS tablet Take 2 tablets by mouth 2 times daily for 7 days, THEN 1 tablet 2 times daily for 23 days. Qty: 74 tablet, Refills: 0      insulin glargine (LANTUS) 100 UNIT/ML injection vial Inject 10 Units into the skin nightly  Qty: 10 mL, Refills: 3      insulin glargine (LANTUS SOLOSTAR) 100 UNIT/ML injection pen Inject 15 Units into the skin nightly  Qty: 5 Adjustable Dose Pre-filled Pen Syringe, Refills: 3      insulin lispro, 1 Unit Dial, (HUMALOG KWIKPEN) 100 UNIT/ML SOPN 150-199, give 1 unit. 200-249, give 2 units. 250-299, give 3 units. 300-349 give 4 units. 350-399 give 5 units. Qty: 1 Adjustable Dose Pre-filled Pen Syringe, Refills: 0      methocarbamol (ROBAXIN) 500 MG tablet Take 1 tablet by mouth 4 times daily for 10 days  Qty: 40 tablet, Refills: 0      glucose monitoring (FREESTYLE FREEDOM) kit 1 kit by Does not apply route daily Check blood sugar before meals and at bedtime  Qty: 1 kit, Refills: 0                Details   butalbital-acetaminophen-caffeine (FIORICET, ESGIC) -40 MG per tablet Take 1 tablet by mouth every 6 hours as needed for Headaches  Qty: 30 tablet, Refills: 0      acetaminophen (TYLENOL) 325 MG tablet Take 650 mg by mouth every 6 hours as needed. Indications: OTC             Time Spent on discharge is more than 45 minutes in the examination, evaluation, counseling and review of medications and discharge plan. Signed:    CAROLINA Rouse - CNP   9/21/2022      Thank you Brian Rajan DO for the opportunity to be involved in this patient's care. If you have any questions or concerns, please feel free to contact me at [phone].

## 2022-09-21 NOTE — PROGRESS NOTES
Pt d/c'd home. Removed peripheral IV and stopped bleeding. Catheter intact. Pt tolerated well. No redness noted at site. Notified [facility] and removed tele box. Reviewed d/c instructions, home meds, and  f/u information utilizing teach-back method. Scripts sent to [facility] and discussed in detail with patient and spouse. Patient verbalized understanding.  Electronically signed by Lona Couch RN on 9/21/22 at 1:43 PM EDT

## 2022-09-21 NOTE — PLAN OF CARE
Problem: Discharge Planning  Goal: Discharge to home or other facility with appropriate resources  Outcome: Progressing     Problem: Pain  Goal: Verbalizes/displays adequate comfort level or baseline comfort level  9/20/2022 1556 by Lulu Santamaria RN  Outcome: Progressing

## 2022-09-22 ENCOUNTER — OFFICE VISIT (OUTPATIENT)
Dept: FAMILY MEDICINE CLINIC | Age: 62
End: 2022-09-22
Payer: COMMERCIAL

## 2022-09-22 VITALS
BODY MASS INDEX: 30.74 KG/M2 | WEIGHT: 233 LBS | HEART RATE: 83 BPM | OXYGEN SATURATION: 98 % | SYSTOLIC BLOOD PRESSURE: 144 MMHG | DIASTOLIC BLOOD PRESSURE: 80 MMHG

## 2022-09-22 DIAGNOSIS — B02.9 HERPES ZOSTER WITHOUT COMPLICATION: ICD-10-CM

## 2022-09-22 DIAGNOSIS — E11.9 NEW ONSET TYPE 2 DIABETES MELLITUS (HCC): Primary | ICD-10-CM

## 2022-09-22 DIAGNOSIS — Z09 HOSPITAL DISCHARGE FOLLOW-UP: ICD-10-CM

## 2022-09-22 DIAGNOSIS — I26.99 ACUTE PULMONARY EMBOLISM WITHOUT ACUTE COR PULMONALE, UNSPECIFIED PULMONARY EMBOLISM TYPE (HCC): ICD-10-CM

## 2022-09-22 PROCEDURE — 1111F DSCHRG MED/CURRENT MED MERGE: CPT | Performed by: NURSE PRACTITIONER

## 2022-09-22 PROCEDURE — 99496 TRANSJ CARE MGMT HIGH F2F 7D: CPT | Performed by: NURSE PRACTITIONER

## 2022-09-22 RX ORDER — VALACYCLOVIR HYDROCHLORIDE 1 G/1
1000 TABLET, FILM COATED ORAL 3 TIMES DAILY
Qty: 21 TABLET | Refills: 0 | Status: SHIPPED | OUTPATIENT
Start: 2022-09-22 | End: 2022-09-29

## 2022-09-22 ASSESSMENT — ANXIETY QUESTIONNAIRES
4. TROUBLE RELAXING: 0
1. FEELING NERVOUS, ANXIOUS, OR ON EDGE: 0
GAD7 TOTAL SCORE: 0
2. NOT BEING ABLE TO STOP OR CONTROL WORRYING: 0
5. BEING SO RESTLESS THAT IT IS HARD TO SIT STILL: 0
3. WORRYING TOO MUCH ABOUT DIFFERENT THINGS: 0
IF YOU CHECKED OFF ANY PROBLEMS ON THIS QUESTIONNAIRE, HOW DIFFICULT HAVE THESE PROBLEMS MADE IT FOR YOU TO DO YOUR WORK, TAKE CARE OF THINGS AT HOME, OR GET ALONG WITH OTHER PEOPLE: NOT DIFFICULT AT ALL
7. FEELING AFRAID AS IF SOMETHING AWFUL MIGHT HAPPEN: 0
6. BECOMING EASILY ANNOYED OR IRRITABLE: 0

## 2022-09-22 ASSESSMENT — PATIENT HEALTH QUESTIONNAIRE - PHQ9
SUM OF ALL RESPONSES TO PHQ QUESTIONS 1-9: 4
10. IF YOU CHECKED OFF ANY PROBLEMS, HOW DIFFICULT HAVE THESE PROBLEMS MADE IT FOR YOU TO DO YOUR WORK, TAKE CARE OF THINGS AT HOME, OR GET ALONG WITH OTHER PEOPLE: 0
9. THOUGHTS THAT YOU WOULD BE BETTER OFF DEAD, OR OF HURTING YOURSELF: 0
7. TROUBLE CONCENTRATING ON THINGS, SUCH AS READING THE NEWSPAPER OR WATCHING TELEVISION: 0
6. FEELING BAD ABOUT YOURSELF - OR THAT YOU ARE A FAILURE OR HAVE LET YOURSELF OR YOUR FAMILY DOWN: 0
SUM OF ALL RESPONSES TO PHQ QUESTIONS 1-9: 4
3. TROUBLE FALLING OR STAYING ASLEEP: 1
SUM OF ALL RESPONSES TO PHQ QUESTIONS 1-9: 4
SUM OF ALL RESPONSES TO PHQ QUESTIONS 1-9: 4
5. POOR APPETITE OR OVEREATING: 2
1. LITTLE INTEREST OR PLEASURE IN DOING THINGS: 0
2. FEELING DOWN, DEPRESSED OR HOPELESS: 0
8. MOVING OR SPEAKING SO SLOWLY THAT OTHER PEOPLE COULD HAVE NOTICED. OR THE OPPOSITE, BEING SO FIGETY OR RESTLESS THAT YOU HAVE BEEN MOVING AROUND A LOT MORE THAN USUAL: 0
SUM OF ALL RESPONSES TO PHQ9 QUESTIONS 1 & 2: 0
4. FEELING TIRED OR HAVING LITTLE ENERGY: 1

## 2022-09-22 NOTE — PROGRESS NOTES
Post-Discharge Transitional Care  Follow Up      Aparna Gonzalez   YOB: 1960    Date of Office Visit:  9/22/2022  Date of Hospital Admission: 9/19/22  Date of Hospital Discharge: 9/21/22  Risk of hospital readmission (high >=14%. Medium >=10%) :Readmission Risk Score: 4.2      Care management risk score Rising risk (score 2-5) and Complex Care (Scores >=6): No Risk Score On File     Non face to face  following discharge, date last encounter closed (first attempt may have been earlier): 09/21/2022    Call initiated 2 business days of discharge: Yes    ASSESSMENT/PLAN:   New onset type 2 diabetes mellitus ([address]  -Reviewed diabetes, educated on insulins, timing of sugar checks, s/sx of low sugars-and what to do if sugar is low.   -sugars still averaging in 200s, continue 15 u of lantus nightly, after 7 days increase to 18 units, if sugars still running 200s. -will ask RN educator to call pt to review diabetes education, medication management, diet, and lifestyle recommendations. -f/u in 4 weeks. -a1c check due in December. -reviewed importance of f/u to manage    Acute pulmonary embolism without acute cor pulmonale, unspecified   pulmonary embolism type ([address]  -     [facility] - Edgardo Oliver MD, Oncology, [facility]  -     Cardiac event monitor; Future  -Continue eliquis, has 90 supply; educated pt to refill medication prior to running out.  -hospital placed referral to [facility] for management and evaluation of unprovoked clot. -orders placed for outpatient heart monitor 2 weeks to assess for any arrhythmia as cause. -reviewed alarm symptoms. Herpes zoster without complication  -Current shingles outbreak to left torso. -Avoid NSAIDs due to eliquis. Pain currently tolerable. -Reviewed transmission risk and how to prevent transmission  -New lesion appeared yesterday, <72hrs, will start valtrex treatment. -reviewed use and s/e.      Medical Decision Making: high complexity  Return in about 4 weeks (around 10/20/2022), or if symptoms worsen or fail to improve. Subjective:   HPI:  Follow up of Hospital problems/diagnosis(es): see epic. Inpatient course: Discharge summary reviewed- see chart. Interval history/Current status: stable. Taking eliquis as prescribed for Unprovoked PE. Echo normal during hospitalization. No hx of clots, no recent travel, surgery, prolonged immobilization. Has lost 20 lbs over the last few months without trying unsure if related to new diabetes diagnosis. Has been checking sugars before meals. Running 200-240 in AM. A1c was 12 in hospital. Using sliding scale lispro with meals, and 15 units of lantus nightly. Denies any lows. Mother had diabetes. Has been writing down sugars in log. C/o rash to left abdomen/torso. Tender to touch, thought it was related to heart monitor. Denies fever, chills. Had new lesion develop yesterday.had left sided pain one week prior to rash eruption. Has not been vaccinated for shingles. Patient Active Problem List   Diagnosis    Allergic rhinitis    Sensorineural hearing loss, bilateral    Umbilical hernia without obstruction and without gangrene    Acute pulmonary embolism without acute cor pulmonale, unspecified pulmonary embolism type [facility])    New onset type 2 diabetes mellitus ([facility]       Medications listed as ordered at the time of discharge from hospital     Medication List            Accurate as of September 22, 2022  1:25 PM. If you have any questions, ask your nurse or doctor. CONTINUE taking these medications      acetaminophen 325 MG tablet  Commonly known as: TYLENOL     apixaban 5 MG Tabs tablet  Commonly known as: ELIQUIS  Take 2 tablets by mouth 2 times daily for 7 days, THEN 1 tablet 2 times daily for 23 days.   Start taking on: September 21, 2022     butalbital-acetaminophen-caffeine -40 MG per tablet  Commonly known as: FIORICET, ESGIC  Take 1 tablet by mouth every 6 hours as needed for Headaches     glucose monitoring kit  1 kit by Does not apply route daily Check blood sugar before meals and at bedtime     * insulin glargine 100 UNIT/ML injection vial  Commonly known as: LANTUS  Inject 10 Units into the skin nightly     * Lantus SoloStar 100 UNIT/ML injection pen  Generic drug: insulin glargine  Inject 15 Units into the skin nightly     insulin lispro (1 Unit Dial) 100 UNIT/ML Sopn  Commonly known as: HumaLOG KwikPen  150-199, give 1 unit. 200-249, give 2 units. 250-299, give 3 units. 300-349 give 4 units. 350-399 give 5 units. methocarbamol 500 MG tablet  Commonly known as: ROBAXIN  Take 1 tablet by mouth 4 times daily for 10 days           * This list has 2 medication(s) that are the same as other medications prescribed for you. Read the directions carefully, and ask your doctor or other care provider to review them with you. Medications marked \"taking\" at this time  Outpatient Medications Marked as Taking for the 9/22/22 encounter (Office Visit) with CAROLINA La - CNP   Medication Sig Dispense Refill    apixaban (ELIQUIS) 5 MG TABS tablet Take 2 tablets by mouth 2 times daily for 7 days, THEN 1 tablet 2 times daily for 23 days. 74 tablet 0    insulin glargine (LANTUS SOLOSTAR) 100 UNIT/ML injection pen Inject 15 Units into the skin nightly 5 Adjustable Dose Pre-filled Pen Syringe 3    insulin lispro, 1 Unit Dial, (HUMALOG KWIKPEN) 100 UNIT/ML SOPN 150-199, give 1 unit. 200-249, give 2 units. 250-299, give 3 units. 300-349 give 4 units. 350-399 give 5 units.  1 Adjustable Dose Pre-filled Pen Syringe 0    methocarbamol (ROBAXIN) 500 MG tablet Take 1 tablet by mouth 4 times daily for 10 days 40 tablet 0    glucose monitoring (FREESTYLE FREEDOM) kit 1 kit by Does not apply route daily Check blood sugar before meals and at bedtime 1 kit 0    butalbital-acetaminophen-caffeine (FIORICET, ESGIC) -40 MG per tablet Take 1 tablet by mouth every 6 hours as needed for Headaches 30 tablet 0        Medications patient taking as of now reconciled against medications ordered at time of hospital discharge: Yes    A comprehensive review of systems was negative except for what was noted in the HPI. Objective:    BP (!) 144/80 (Site: Right Upper Arm, Position: Sitting, Cuff Size: Medium Adult)   Pulse 83   Wt 233 lb (105.7 kg)   SpO2 98%   BMI 30.74 kg/m²   General Appearance: alert and oriented to person, place and time, well developed and well- nourished, in no acute distress  Skin: warm and dry, erythematous rash along left torso, and left upper abdomen. Tender to touch. Fluid filled blisters noted along rash. No crusted lesions yet. Head: normocephalic and atraumatic  Eyes: pupils equal, round, and reactive to light, extraocular eye movements intact, conjunctivae normal  ENT: tympanic membrane, external ear and ear canal normal bilaterally, nose without deformity, nasal mucosa and turbinates normal without polyps  Neck: supple and non-tender without mass, no thyromegaly or thyroid nodules, no cervical lymphadenopathy  Pulmonary/Chest: clear to auscultation bilaterally- no wheezes, rales or rhonchi, normal air movement, no respiratory distress  Cardiovascular: normal rate, regular rhythm, normal S1 and S2, no murmurs, rubs, clicks, or gallops, distal pulses intact, no carotid bruits  Abdomen: soft, non-tender, non-distended, normal bowel sounds, no masses or organomegaly  Extremities: no cyanosis, clubbing or edema  Musculoskeletal: normal range of motion, no joint swelling, deformity or tenderness  Neurologic: reflexes normal and symmetric, no cranial nerve deficit, gait, coordination and speech normal      An electronic signature was used to authenticate this note. --Haylee Quintana, CAROLINA - CNP

## 2022-09-23 ENCOUNTER — PATIENT MESSAGE (OUTPATIENT)
Dept: FAMILY MEDICINE CLINIC | Age: 62
End: 2022-09-23

## 2022-09-23 ENCOUNTER — TELEPHONE (OUTPATIENT)
Dept: FAMILY MEDICINE CLINIC | Age: 62
End: 2022-09-23

## 2022-09-23 DIAGNOSIS — I26.99 ACUTE PULMONARY EMBOLISM WITHOUT ACUTE COR PULMONALE, UNSPECIFIED PULMONARY EMBOLISM TYPE (HCC): Primary | ICD-10-CM

## 2022-09-23 DIAGNOSIS — Z79.4 LONG TERM (CURRENT) USE OF INSULIN (HCC): ICD-10-CM

## 2022-09-23 DIAGNOSIS — E11.9 NEW ONSET TYPE 2 DIABETES MELLITUS (HCC): Primary | ICD-10-CM

## 2022-09-23 RX ORDER — FLASH GLUCOSE SENSOR
1 KIT MISCELLANEOUS
Qty: 2 EACH | Refills: 5 | Status: SHIPPED | OUTPATIENT
Start: 2022-09-23

## 2022-09-23 RX ORDER — FLASH GLUCOSE SCANNING READER
1 EACH MISCELLANEOUS DAILY
Qty: 1 EACH | Refills: 0 | Status: SHIPPED | OUTPATIENT
Start: 2022-09-23

## 2022-09-23 NOTE — TELEPHONE ENCOUNTER
On Call Communication:  Call from [employer]. Increased lantus to 18 units last night. Using humalog insulin 3 times daily. This morning BS is at 120. Instructions are to give the humalog if greater than 150. Checking to see if he should hold the humalog. Advised to hold as per directions but to check at lunch time, will probably need to give a dose. Last HgA1c 12. 2.

## 2022-09-23 NOTE — TELEPHONE ENCOUNTER
From: Aparna Gonzalez  To: Zeyadkasieninoska Quintana  Sent: 9/23/2022 4:37 PM EDT  Subject: Hematology Referral    [facility] and [facility] are not part of my network. Could you send a referral for for the [facility] at  [address]  In-Network  Gender  Male  Specialty  Hematology &  Oncology, Int Med  Hematology, Int Med  Internal Medicine  Medical Oncology  Recognitions  Cooperative Care Medicare and [facility]  Electronic Health  [facility] Incentive  Programs  Location  3.29 miles  [address] [place] [address], [address] [place] [address]  Telephone  [phone]    According to [employer] this group should be in-network. The [facility] are not in my network.

## 2022-09-23 NOTE — TELEPHONE ENCOUNTER
Called patient regarding DM education and CGM. Patient agreeable to Mann 2 system. Order placed and sent to [address] [place] [address] (OK per verbal from Yelena Muniz, NP).   Patient will come into office on 9/26 at 1400 to review diet, CGM, and medications

## 2022-09-26 ENCOUNTER — NURSE ONLY (OUTPATIENT)
Dept: FAMILY MEDICINE CLINIC | Age: 62
End: 2022-09-26

## 2022-09-26 ENCOUNTER — TELEPHONE (OUTPATIENT)
Dept: FAMILY MEDICINE CLINIC | Age: 62
End: 2022-09-26

## 2022-09-26 NOTE — TELEPHONE ENCOUNTER
----- Message from Marcelle Vazquez sent at 9/26/2022 10:44 AM EDT -----  Subject: Message to Provider    QUESTIONS  Information for Provider? Patient was in on 9/22/2022 by nurse Thomas Quintana and he was given a order for a heart monitor. Patient was   unsure how to go about getting this heart monitor. If he is supposed to   get this in the office. Please call patient to confirm. ---------------------------------------------------------------------------  --------------  Jonas LAI  [phone]; OK to leave message on voicemail  ---------------------------------------------------------------------------  --------------  SCRIPT ANSWERS  Relationship to Patient?  Self

## 2022-09-26 NOTE — PROGRESS NOTES
Patient came into office for CGM education. Patient shown how to apply and use freestyle braulio sensor with the applicator. Placed sensor into LUE. Assisted patient with setting up free style braulio shelly and educated on how to set up new sensor, access settings, and scan for blood sugar reading by using his phone. Discussed the importance of being careful to not knock off sensor and steps to take incase sensor does fall off. Patient was also added to our [employer] portal. Patient demonstrated understanding of how to use FreeStyle Braulio 2 sensor and shelly and denied any questions at this time. Reviewed insulin use and reading sliding scale. Discussed s/s of hyper/hypoglycemia and actions to take to correct. Patient educated on carb counting, reading nutrition label and portion control. Provided patient with handouts to take home for future reference. A total of 60 mins was spent providing education. Informed patient to contact the office if he has any further questions or concerns regarding his DM management.      Electronically signed by Marko Alan RN on 9/26/2022 at 2:05 PM

## 2022-09-26 NOTE — TELEPHONE ENCOUNTER
Tried to call patient to let him to call the number on the paper he got where it says Scheduling Instructions.

## 2022-09-30 ENCOUNTER — NURSE ONLY (OUTPATIENT)
Dept: CARDIOLOGY CLINIC | Age: 62
End: 2022-09-30

## 2022-09-30 DIAGNOSIS — R06.02 SOB (SHORTNESS OF BREATH): Primary | ICD-10-CM

## 2022-09-30 NOTE — PROGRESS NOTES
Monitor placed by [facility] vital  Length of monitor 14  days   Monitor ordered by Mary landry cnp/ Dr. Marika Richards   Serial number [address] [place] successful prior to pt leaving office?  Yes

## 2022-10-06 ENCOUNTER — TELEPHONE (OUTPATIENT)
Dept: FAMILY MEDICINE CLINIC | Age: 62
End: 2022-10-06

## 2022-10-06 NOTE — TELEPHONE ENCOUNTER
Received phone call from pt in regards to pt stated that he is a newly DX diabetic and he recently found out he has a blood clot in his Right Lung pt is asking for advise on if he is aloud to walk and exercise with having this blood clot. Pt stated that he is just needing some information on if he is aloud to do this or not.  Please advise    Good call back phone #: [phone]

## 2022-10-14 ENCOUNTER — TELEPHONE (OUTPATIENT)
Dept: FAMILY MEDICINE CLINIC | Age: 62
End: 2022-10-14

## 2022-10-14 NOTE — TELEPHONE ENCOUNTER
Met with patient to assist with CGM. Patient educated on how to properly place sensor, discussed importance of rotating arms and how to prevent sensor from coming off. Patient demonstrated understanding of how to use FreeStyle xTV 2 system and denied any questions at this time.     Electronically signed by Brynn Saavedra LPN on 30/90/8922 at 4:07 PM

## 2022-10-18 ENCOUNTER — TELEPHONE (OUTPATIENT)
Dept: FAMILY MEDICINE CLINIC | Age: 62
End: 2022-10-18

## 2022-10-18 DIAGNOSIS — I26.99 ACUTE PULMONARY EMBOLISM WITHOUT ACUTE COR PULMONALE, UNSPECIFIED PULMONARY EMBOLISM TYPE (HCC): Primary | ICD-10-CM

## 2022-10-18 NOTE — TELEPHONE ENCOUNTER
----- Message from Javi Vizcaino sent at 10/18/2022  3:09 PM EDT -----  Subject: Referral Request    Reason for referral request? pt calling as the hematologist that he was   referred to was not in his insurance. Pt found a hematologist in his   network and needs a referral for that one. Provider patient wants to be referred to(if known):     Provider Phone Number(if known):[phone]    Additional Information for Provider? Pt needs referral for this Dr. as he   has an appt.  set for Thursday, 10/20 @ 9:15 AM.  ---------------------------------------------------------------------------  --------------  Lum Chance INFO    [phone]; OK to leave message on voicemail  ---------------------------------------------------------------------------  --------------

## 2022-10-19 NOTE — TELEPHONE ENCOUNTER
Spoke with patient and let him know referral was placed. The Doctor is Dr. Mindi Moe at [facility]. I faxed it to their office and email patient a copy per his request. Thanks!

## 2022-10-21 ENCOUNTER — OFFICE VISIT (OUTPATIENT)
Dept: FAMILY MEDICINE CLINIC | Age: 62
End: 2022-10-21
Payer: COMMERCIAL

## 2022-10-21 VITALS
OXYGEN SATURATION: 98 % | DIASTOLIC BLOOD PRESSURE: 78 MMHG | HEART RATE: 90 BPM | WEIGHT: 228 LBS | SYSTOLIC BLOOD PRESSURE: 138 MMHG | BODY MASS INDEX: 30.08 KG/M2

## 2022-10-21 DIAGNOSIS — Z79.4 LONG TERM (CURRENT) USE OF INSULIN (HCC): ICD-10-CM

## 2022-10-21 DIAGNOSIS — B02.9 HERPES ZOSTER WITHOUT COMPLICATION: ICD-10-CM

## 2022-10-21 DIAGNOSIS — E11.9 NEW ONSET TYPE 2 DIABETES MELLITUS (HCC): Primary | ICD-10-CM

## 2022-10-21 DIAGNOSIS — I26.99 ACUTE PULMONARY EMBOLISM WITHOUT ACUTE COR PULMONALE, UNSPECIFIED PULMONARY EMBOLISM TYPE (HCC): ICD-10-CM

## 2022-10-21 PROCEDURE — 3046F HEMOGLOBIN A1C LEVEL >9.0%: CPT | Performed by: NURSE PRACTITIONER

## 2022-10-21 PROCEDURE — 99214 OFFICE O/P EST MOD 30 MIN: CPT | Performed by: NURSE PRACTITIONER

## 2022-10-21 RX ORDER — INSULIN GLARGINE 100 [IU]/ML
13 INJECTION, SOLUTION SUBCUTANEOUS NIGHTLY
Qty: 5 ADJUSTABLE DOSE PRE-FILLED PEN SYRINGE | Refills: 3 | Status: SHIPPED | OUTPATIENT
Start: 2022-10-21 | End: 2022-10-26

## 2022-10-21 ASSESSMENT — ENCOUNTER SYMPTOMS
ABDOMINAL DISTENTION: 0
VOMITING: 0
WHEEZING: 0
DIARRHEA: 0
CONSTIPATION: 0
ABDOMINAL PAIN: 0
NAUSEA: 0
CHEST TIGHTNESS: 0
SHORTNESS OF BREATH: 0
COUGH: 0

## 2022-10-21 NOTE — PROGRESS NOTES
10/21/2022  Raimundo Garcia (: 1960)  57 y.o.    ASSESSMENT and PLAN:  Sil Russo was seen today for diabetes. Diagnoses and all orders for this visit:    New onset type 2 diabetes mellitus (HCC)  -     insulin glargine (LANTUS SOLOSTAR) 100 UNIT/ML injection pen; Inject 13 Units into the skin nightly  -A1c due in December.   -decrease lantus to 13 due to continued lows, can increase back to 15 if sugars trending up.   -continue sliding scale for short acting insulin only as needed. -reviewed carb control/low sugar diet. -due for eye exam and dental exam.     Long term (current) use of insulin ([address]  -     insulin glargine (LANTUS SOLOSTAR) 100 UNIT/ML injection pen; Inject 13 Units into the skin nightly  -decrease to 13 units nightly due to lows. Ensure nightly snacks before bed. -reviewed use and carb control diet. Herpes zoster without complication  -healed, still having intermittent tenderness, improving. Acute pulmonary embolism without acute cor pulmonale, unspecified pulmonary embolism type ([address]  -continue eliquis and to follow with [facility], will get coupon card just in case eliquis not covered by insurance. Pt instructed to check with pharm today, as refill due tomorrow. -[facility] placed orders for refill, will reassess need for anticoagulation in January. Return in about 2 months (around 2022), or if symptoms worsen or fail to improve. Diabetes  Pertinent negatives for hypoglycemia include no dizziness or headaches. Pertinent negatives for diabetes include no chest pain, no fatigue and no weakness. DM:   Blood glucose levels have been <140 throughout the day. Still experiencing some lows. Using freestyle valery. A1c 12.2 on . Denies vision changes, polyuria, polydipsia, hyper/hypoglycemic episodes, SOB, chest pain, neuropathy. Exam exam-not up to date. Dental-not up to date. Has met with RN to discuss diabetes education, feels he is getting the hang of it.      PE-Saw Dr. Adarsh Mcintyre (Hematology), recommended to stay on eliquis until January. Sent refill. Has f/u appointment Jan 19. Shingles has healed over. Mild scarring. Still experiencing some tenderness at times, otherwise symptoms resolved. Review of Systems   Constitutional:  Negative for activity change, appetite change, chills, fatigue, fever and unexpected weight change. HENT: Negative. Respiratory:  Negative for cough, chest tightness, shortness of breath and wheezing. Cardiovascular:  Negative for chest pain, palpitations and leg swelling. Gastrointestinal:  Negative for abdominal distention, abdominal pain, constipation, diarrhea, nausea and vomiting. Genitourinary: Negative. Musculoskeletal: Negative. Skin: Negative. Neurological:  Negative for dizziness, weakness, light-headedness, numbness and headaches. Hematological: Negative. Psychiatric/Behavioral: Negative. Allergies, past medical history, family history, and social history reviewed and unchanged from previous encounter. Current Outpatient Medications   Medication Sig Dispense Refill    Continuous Blood Gluc  (FREESTYLE RAVINDRA 2 READER) JODY 1 Device by Does not apply route daily 1 each 0    Continuous Blood Gluc Sensor (FREESTYLE RAVINDRA 2 SENSOR) MISC 1 Device by Does not apply route every 14 days 2 each 5    apixaban (ELIQUIS) 5 MG TABS tablet Take 2 tablets by mouth 2 times daily for 7 days, THEN 1 tablet 2 times daily for 23 days. 74 tablet 0    insulin glargine (LANTUS SOLOSTAR) 100 UNIT/ML injection pen Inject 15 Units into the skin nightly 5 Adjustable Dose Pre-filled Pen Syringe 3    insulin lispro, 1 Unit Dial, (HUMALOG KWIKPEN) 100 UNIT/ML SOPN 150-199, give 1 unit. 200-249, give 2 units. 250-299, give 3 units. 300-349 give 4 units. 350-399 give 5 units.  1 Adjustable Dose Pre-filled Pen Syringe 0    glucose monitoring (FREESTYLE FREEDOM) kit 1 kit by Does not apply route daily Check blood sugar before meals and at bedtime 1 kit 0    acetaminophen (TYLENOL) 325 MG tablet Take 650 mg by mouth every 6 hours as needed. Indications: OTC      butalbital-acetaminophen-caffeine (FIORICET, ESGIC) -40 MG per tablet Take 1 tablet by mouth every 6 hours as needed for Headaches (Patient not taking: Reported on 10/21/2022) 30 tablet 0     No current facility-administered medications for this visit. Vitals:    10/21/22 1336   BP: 138/78   Site: Right Upper Arm   Position: Sitting   Cuff Size: Large Adult   Pulse: 90   SpO2: 98%   Weight: 228 lb (103.4 kg)     Estimated body mass index is 30.08 kg/m² as calculated from the following:    Height as of 9/19/22: 6' 1\" (1.854 m). Weight as of this encounter: 228 lb (103.4 kg). Physical Exam  Vitals reviewed. Constitutional:       Appearance: Normal appearance. He is normal weight. HENT:      Head: Normocephalic and atraumatic. Nose: Nose normal.   Eyes:      Conjunctiva/sclera: Conjunctivae normal.   Cardiovascular:      Rate and Rhythm: Normal rate and regular rhythm. Pulses: Normal pulses. Heart sounds: Normal heart sounds. Pulmonary:      Effort: Pulmonary effort is normal.      Breath sounds: Normal breath sounds. Abdominal:      General: Abdomen is flat. Bowel sounds are normal.      Palpations: Abdomen is soft. Tenderness: There is no abdominal tenderness. Musculoskeletal:         General: Normal range of motion. Cervical back: Normal range of motion and neck supple. Comments: Shingles healed. Mild nerve pain;   Skin:     General: Skin is warm and dry. Capillary Refill: Capillary refill takes less than 2 seconds. Neurological:      General: No focal deficit present. Mental Status: He is alert and oriented to person, place, and time. Mental status is at baseline. Psychiatric:         Mood and Affect: Mood normal.         Behavior: Behavior normal.         Thought Content:  Thought content normal. Judgment: Judgment normal.

## 2022-10-26 RX ORDER — INSULIN GLARGINE 100 [IU]/ML
7 INJECTION, SOLUTION SUBCUTANEOUS NIGHTLY
Qty: 5 ADJUSTABLE DOSE PRE-FILLED PEN SYRINGE | Refills: 3
Start: 2022-10-26

## 2022-11-03 PROCEDURE — 93272 ECG/REVIEW INTERPRET ONLY: CPT | Performed by: INTERNAL MEDICINE

## 2022-11-10 DIAGNOSIS — R00.2 PALPITATIONS: Primary | ICD-10-CM

## 2022-11-10 DIAGNOSIS — I26.99 ACUTE PULMONARY EMBOLISM WITHOUT ACUTE COR PULMONALE, UNSPECIFIED PULMONARY EMBOLISM TYPE (HCC): ICD-10-CM

## 2022-12-20 ENCOUNTER — OFFICE VISIT (OUTPATIENT)
Dept: FAMILY MEDICINE CLINIC | Age: 62
End: 2022-12-20
Payer: COMMERCIAL

## 2022-12-20 VITALS
DIASTOLIC BLOOD PRESSURE: 74 MMHG | SYSTOLIC BLOOD PRESSURE: 132 MMHG | WEIGHT: 220 LBS | OXYGEN SATURATION: 98 % | BODY MASS INDEX: 29.03 KG/M2 | HEART RATE: 71 BPM

## 2022-12-20 DIAGNOSIS — Z00.00 PREVENTATIVE HEALTH CARE: ICD-10-CM

## 2022-12-20 DIAGNOSIS — E78.2 MIXED HYPERLIPIDEMIA: ICD-10-CM

## 2022-12-20 DIAGNOSIS — Z79.4 TYPE 2 DIABETES MELLITUS WITHOUT COMPLICATION, WITH LONG-TERM CURRENT USE OF INSULIN (HCC): Primary | ICD-10-CM

## 2022-12-20 DIAGNOSIS — I26.99 ACUTE PULMONARY EMBOLISM WITHOUT ACUTE COR PULMONALE, UNSPECIFIED PULMONARY EMBOLISM TYPE (HCC): ICD-10-CM

## 2022-12-20 DIAGNOSIS — Z12.5 SCREENING PSA (PROSTATE SPECIFIC ANTIGEN): ICD-10-CM

## 2022-12-20 DIAGNOSIS — E55.9 VITAMIN D DEFICIENCY: ICD-10-CM

## 2022-12-20 DIAGNOSIS — E11.9 TYPE 2 DIABETES MELLITUS WITHOUT COMPLICATION, WITH LONG-TERM CURRENT USE OF INSULIN (HCC): Primary | ICD-10-CM

## 2022-12-20 DIAGNOSIS — Z11.59 NEED FOR HEPATITIS C SCREENING TEST: ICD-10-CM

## 2022-12-20 DIAGNOSIS — Z11.4 ENCOUNTER FOR SCREENING FOR HIV: ICD-10-CM

## 2022-12-20 LAB
CREATININE URINE POCT: 50
HBA1C MFR BLD: 7 %
MICROALBUMIN/CREAT 24H UR: 10 MG/G{CREAT}
MICROALBUMIN/CREAT UR-RTO: <30

## 2022-12-20 PROCEDURE — 82044 UR ALBUMIN SEMIQUANTITATIVE: CPT | Performed by: FAMILY MEDICINE

## 2022-12-20 PROCEDURE — 83036 HEMOGLOBIN GLYCOSYLATED A1C: CPT | Performed by: FAMILY MEDICINE

## 2022-12-20 PROCEDURE — 99396 PREV VISIT EST AGE 40-64: CPT | Performed by: FAMILY MEDICINE

## 2022-12-20 RX ORDER — ATORVASTATIN CALCIUM 40 MG/1
40 TABLET, FILM COATED ORAL DAILY
Qty: 30 TABLET | Refills: 5 | Status: SHIPPED | OUTPATIENT
Start: 2022-12-20

## 2022-12-20 NOTE — PROGRESS NOTES
Kathy Dixon is a 60 y.o. male    Chief Complaint   Patient presents with    Other     Blood clots    Diabetes    Annual Exam       HPI:    This is a new patient to me. Preventative care. Declined vaccines for now. Diabetes  He presents for his follow-up diabetic visit. He has type 2 diabetes mellitus. His disease course has been stable. Pertinent negatives for diabetes include no polydipsia. Risk factors for coronary artery disease include male sex and obesity. Current diabetic treatment includes intensive insulin program. His breakfast blood glucose range is generally 110-130 mg/dl. An ACE inhibitor/angiotensin II receptor blocker is not being taken. History of PE. He did not have any COVID at the time. No CP or SOB. No rectal bleeding. ROS:    Review of Systems   Endocrine: Negative for polydipsia. /74   Pulse 71   Wt 220 lb (99.8 kg)   SpO2 98%   BMI 29.03 kg/m²     Physical Exam:    Physical Exam  Constitutional:       General: He is not in acute distress. Appearance: Normal appearance. He is well-developed. He is not ill-appearing, toxic-appearing or diaphoretic. HENT:      Head: Normocephalic. Cardiovascular:      Rate and Rhythm: Normal rate and regular rhythm. Pulses: Normal pulses. Heart sounds: No murmur heard. Pulmonary:      Effort: Pulmonary effort is normal. No respiratory distress. Breath sounds: Normal breath sounds. No wheezing. Musculoskeletal:      Cervical back: Normal range of motion. No rigidity. Lymphadenopathy:      Cervical: No cervical adenopathy. Neurological:      Mental Status: He is alert. Psychiatric:         Mood and Affect: Mood normal.         Behavior: Behavior normal.         Thought Content:  Thought content normal.       Current Outpatient Medications   Medication Sig Dispense Refill    atorvastatin (LIPITOR) 40 MG tablet Take 1 tablet by mouth daily Do not fill until desired 30 tablet 5    insulin glargine (LANTUS SOLOSTAR) 100 UNIT/ML injection pen Inject 7 Units into the skin nightly 5 Adjustable Dose Pre-filled Pen Syringe 3    Continuous Blood Gluc  (FREESTYLE RAVINDRA 2 READER) JODY 1 Device by Does not apply route daily 1 each 0    Continuous Blood Gluc Sensor (FREESTYLE RAVINDRA 2 SENSOR) MISC 1 Device by Does not apply route every 14 days 2 each 5    apixaban (ELIQUIS) 5 MG TABS tablet Take 2 tablets by mouth 2 times daily for 7 days, THEN 1 tablet 2 times daily for 23 days. 74 tablet 0    insulin lispro, 1 Unit Dial, (HUMALOG KWIKPEN) 100 UNIT/ML SOPN 150-199, give 1 unit. 200-249, give 2 units. 250-299, give 3 units. 300-349 give 4 units. 350-399 give 5 units. 1 Adjustable Dose Pre-filled Pen Syringe 0    glucose monitoring (FREESTYLE FREEDOM) kit 1 kit by Does not apply route daily Check blood sugar before meals and at bedtime 1 kit 0    butalbital-acetaminophen-caffeine (FIORICET, ESGIC) -40 MG per tablet Take 1 tablet by mouth every 6 hours as needed for Headaches (Patient not taking: Reported on 10/21/2022) 30 tablet 0    acetaminophen (TYLENOL) 325 MG tablet Take 650 mg by mouth every 6 hours as needed. Indications: OTC       No current facility-administered medications for this visit. Assessment:    1. Type 2 diabetes mellitus without complication, with long-term current use of insulin ([address]    2. Preventative health care    3. Acute pulmonary embolism without acute cor pulmonale, unspecified pulmonary embolism type ([address]    4. Mixed hyperlipidemia    5. Vitamin D deficiency    6. Screening PSA (prostate specific antigen)    7. Need for hepatitis C screening test    8. Encounter for screening for HIV        Plan:    1. Preventative health care    2. Type 2 diabetes mellitus without complication, with long-term current use of insulin ([facility])  A1c was 7.0 demonstrating excellent diabetic control. Continue current medicines. - POCT glycosylated hemoglobin (Hb A1C)  - POCT microalbumin    3.  Acute pulmonary embolism without acute cor pulmonale, unspecified pulmonary embolism type (HCC)  Continue Eliquis per Hem/Onc recommendations. 4. Mixed hyperlipidemia  Recheck but likely he will need a statin given diabetic status over age 36.  - atorvastatin (LIPITOR) 40 MG tablet; Take 1 tablet by mouth daily Do not fill until desired  Dispense: 30 tablet; Refill: 5  - CBC with Auto Differential; Future  - Comprehensive Metabolic Panel; Future  - Lipid Panel; Future  - Vitamin B12 & Folate; Future  - TSH with Reflex; Future    5. Vitamin D deficiency  - Vitamin D 25 Hydroxy; Future    6. Screening PSA (prostate specific antigen)  - PSA Screening; Future    7. Need for hepatitis C screening test  - Hepatitis C Antibody; Future    8. Encounter for screening for HIV  - HIV Screen; Future      Return in about 6 months (around 6/20/2023) for Diabetes, Hyperlipidemia.

## 2023-02-17 ENCOUNTER — TELEPHONE (OUTPATIENT)
Dept: FAMILY MEDICINE CLINIC | Age: 63
End: 2023-02-17

## 2023-03-16 DIAGNOSIS — E11.9 NEW ONSET TYPE 2 DIABETES MELLITUS (HCC): ICD-10-CM

## 2023-03-16 DIAGNOSIS — Z79.4 LONG TERM (CURRENT) USE OF INSULIN (HCC): ICD-10-CM

## 2023-03-17 RX ORDER — FLASH GLUCOSE SENSOR
KIT MISCELLANEOUS
Qty: 2 EACH | Refills: 5 | Status: SHIPPED | OUTPATIENT
Start: 2023-03-17

## 2023-03-17 NOTE — TELEPHONE ENCOUNTER
.Refill Request     CONFIRM preferred pharmacy with the patient. If Mail Order Rx - Pend for 90 day refill. Last Seen: Last Seen Department: 12/20/2022  Last Seen by PCP: Visit date not found    Last Written: 9-23-22 2 with 5     If no future appointment scheduled, route STAFF MESSAGE with patient name to the [facility] for scheduling. Next Appointment:   Future Appointments   Date Time Provider Alex Sutherland   6/21/2023  4:00 PM DO JUAN R Forman [place] - [place]       Message sent to LS9 to schedule appt with patient?   N/A      Requested Prescriptions     Pending Prescriptions Disp Refills    Continuous Blood Gluc Sensor (FREESTYLE RAVINDRA 2 SENSOR) MISC [Pharmacy Med Name: FREESTYLE RAVINDRA 2 SENSOR] 2 each 5     Sig: APPLY/REMOVE ONE DEVICE EVERY 14 DAYS

## 2023-05-26 ENCOUNTER — TELEPHONE (OUTPATIENT)
Dept: ADMINISTRATIVE | Age: 63
End: 2023-05-26

## 2023-05-31 ENCOUNTER — TELEPHONE (OUTPATIENT)
Dept: FAMILY MEDICINE CLINIC | Age: 63
End: 2023-05-31

## 2023-07-09 DIAGNOSIS — E11.9 TYPE 2 DIABETES MELLITUS WITHOUT COMPLICATION, WITH LONG-TERM CURRENT USE OF INSULIN (HCC): ICD-10-CM

## 2023-07-09 DIAGNOSIS — Z79.4 TYPE 2 DIABETES MELLITUS WITHOUT COMPLICATION, WITH LONG-TERM CURRENT USE OF INSULIN (HCC): ICD-10-CM

## 2023-07-10 NOTE — TELEPHONE ENCOUNTER
Last Office Visit  -  06/13/2023  Next Office Visit  -  N/A    Last Filled  -    Last UDS -    Contract -

## 2023-08-23 ENCOUNTER — PATIENT MESSAGE (OUTPATIENT)
Dept: FAMILY MEDICINE CLINIC | Age: 63
End: 2023-08-23

## 2023-08-23 NOTE — TELEPHONE ENCOUNTER
From: Parveen Carney  To: Claudette Anna  Sent: 8/23/2023 6:50 AM EDT  Subject: Prescriptions/refills    Neelima Zacarias,  I am getting low on my 4mm pen needles, finger pricks, and glucose test strips. Could I get a prescription for each of these? I use the pharmacy at [facility] in [facility].   Thank you,  Parveen Carney

## 2023-09-06 DIAGNOSIS — E11.9 NEW ONSET TYPE 2 DIABETES MELLITUS (HCC): ICD-10-CM

## 2023-09-06 DIAGNOSIS — Z79.4 LONG TERM (CURRENT) USE OF INSULIN (HCC): ICD-10-CM

## 2023-09-06 NOTE — TELEPHONE ENCOUNTER
Last Office Visit  -  6/13/23  Next Office Visit  -  n/a    Last Filled  -    Last UDS -    Contract -

## 2023-11-17 DIAGNOSIS — Z79.4 LONG TERM (CURRENT) USE OF INSULIN (HCC): ICD-10-CM

## 2023-11-17 DIAGNOSIS — E11.9 NEW ONSET TYPE 2 DIABETES MELLITUS (HCC): ICD-10-CM

## 2023-11-19 SDOH — ECONOMIC STABILITY: FOOD INSECURITY: WITHIN THE PAST 12 MONTHS, THE FOOD YOU BOUGHT JUST DIDN'T LAST AND YOU DIDN'T HAVE MONEY TO GET MORE.: NEVER TRUE

## 2023-11-19 SDOH — ECONOMIC STABILITY: INCOME INSECURITY: HOW HARD IS IT FOR YOU TO PAY FOR THE VERY BASICS LIKE FOOD, HOUSING, MEDICAL CARE, AND HEATING?: SOMEWHAT HARD

## 2023-11-19 SDOH — ECONOMIC STABILITY: TRANSPORTATION INSECURITY
IN THE PAST 12 MONTHS, HAS LACK OF TRANSPORTATION KEPT YOU FROM MEETINGS, WORK, OR FROM GETTING THINGS NEEDED FOR DAILY LIVING?: YES

## 2023-11-19 SDOH — ECONOMIC STABILITY: HOUSING INSECURITY
IN THE LAST 12 MONTHS, WAS THERE A TIME WHEN YOU DID NOT HAVE A STEADY PLACE TO SLEEP OR SLEPT IN A SHELTER (INCLUDING NOW)?: NO

## 2023-11-19 SDOH — ECONOMIC STABILITY: FOOD INSECURITY: WITHIN THE PAST 12 MONTHS, YOU WORRIED THAT YOUR FOOD WOULD RUN OUT BEFORE YOU GOT MONEY TO BUY MORE.: SOMETIMES TRUE

## 2023-11-20 ENCOUNTER — OFFICE VISIT (OUTPATIENT)
Dept: FAMILY MEDICINE CLINIC | Age: 63
End: 2023-11-20
Payer: COMMERCIAL

## 2023-11-20 VITALS
SYSTOLIC BLOOD PRESSURE: 114 MMHG | WEIGHT: 240 LBS | DIASTOLIC BLOOD PRESSURE: 70 MMHG | HEART RATE: 78 BPM | OXYGEN SATURATION: 98 % | BODY MASS INDEX: 33.47 KG/M2

## 2023-11-20 DIAGNOSIS — E11.65 TYPE 2 DIABETES MELLITUS WITH HYPERGLYCEMIA, WITHOUT LONG-TERM CURRENT USE OF INSULIN (HCC): Primary | ICD-10-CM

## 2023-11-20 DIAGNOSIS — R42 DIZZINESS: ICD-10-CM

## 2023-11-20 LAB — HBA1C MFR BLD: 6.7 %

## 2023-11-20 PROCEDURE — 99214 OFFICE O/P EST MOD 30 MIN: CPT | Performed by: NURSE PRACTITIONER

## 2023-11-20 PROCEDURE — 3044F HG A1C LEVEL LT 7.0%: CPT | Performed by: NURSE PRACTITIONER

## 2023-11-20 PROCEDURE — 83036 HEMOGLOBIN GLYCOSYLATED A1C: CPT | Performed by: NURSE PRACTITIONER

## 2023-11-20 ASSESSMENT — PATIENT HEALTH QUESTIONNAIRE - PHQ9
SUM OF ALL RESPONSES TO PHQ QUESTIONS 1-9: 0
SUM OF ALL RESPONSES TO PHQ QUESTIONS 1-9: 0
1. LITTLE INTEREST OR PLEASURE IN DOING THINGS: 0
2. FEELING DOWN, DEPRESSED OR HOPELESS: 0
SUM OF ALL RESPONSES TO PHQ9 QUESTIONS 1 & 2: 0
SUM OF ALL RESPONSES TO PHQ QUESTIONS 1-9: 0
SUM OF ALL RESPONSES TO PHQ QUESTIONS 1-9: 0

## 2023-11-20 NOTE — PROGRESS NOTES
rhythm. Heart sounds: Normal heart sounds. No murmur heard. Pulmonary:      Effort: Pulmonary effort is normal. No respiratory distress. Breath sounds: Normal breath sounds. No wheezing or rales. Musculoskeletal:         General: Normal range of motion. Cervical back: Normal range of motion. Lymphadenopathy:      Cervical: No cervical adenopathy. Skin:     General: Skin is warm and dry. Neurological:      General: No focal deficit present. Mental Status: He is alert and oriented to person, place, and time. Deep Tendon Reflexes: Reflexes are normal and symmetric. Psychiatric:         Mood and Affect: Mood normal.         Behavior: Behavior normal.         Thought Content: Thought content normal.         Judgment: Judgment normal.       Vitals:    11/20/23 1713   BP: 114/70   Pulse: 78   SpO2: 98%       Assessment:  Encounter Diagnoses   Name Primary? Type 2 diabetes mellitus with hyperglycemia, without long-term current use of insulin (HCC) Yes    Dizziness        Plan:  1. Type 2 diabetes mellitus with hyperglycemia, without long-term current use of insulin (HCC)  A1C went up a little- was 6.1 in June. Discussed limiting carbs/sugars/sweets  Increase exercise  He wants to try being diet controlled  Does not want meds at this time  - POCT glycosylated hemoglobin (Hb A1C) 6.7    2. Dizziness    - [address], DO, [facility]        Return in about 3 months (around 2/20/2024) for DM.

## 2023-11-28 ENCOUNTER — TELEMEDICINE (OUTPATIENT)
Dept: FAMILY MEDICINE CLINIC | Age: 63
End: 2023-11-28
Payer: COMMERCIAL

## 2023-11-28 DIAGNOSIS — U07.1 COVID: Primary | ICD-10-CM

## 2023-11-28 PROCEDURE — 99213 OFFICE O/P EST LOW 20 MIN: CPT | Performed by: NURSE PRACTITIONER

## 2023-11-28 ASSESSMENT — ENCOUNTER SYMPTOMS
SHORTNESS OF BREATH: 0
GASTROINTESTINAL NEGATIVE: 1
SORE THROAT: 1
WHEEZING: 0
COUGH: 1

## 2023-11-28 NOTE — PROGRESS NOTES
Sachin Carrasco (:  1960) is a Established patient, presenting virtually for evaluation of the following:    Assessment & Plan   Below is the assessment and plan developed based on review of pertinent history, physical exam, labs, studies, and medications. 1. COVID  Will treat with paxlovid and recommend OTC mucinex. Quarantine guidelines reviewed and patient verbalized his understanding. Follow up if no improvement. -     nirmatrelvir/ritonavir 300/100 (PAXLOVID, 300/100,) 20 x 150 MG & 10 x 100MG TBPK; Take 3 tablets (two 150 mg nirmatrelvir and one 100 mg ritonavir tablets) by mouth every 12 hours for 5 days. , Disp-30 tablet, R-0Normal           Subjective   HPI  Patient presents today with concerns of covid. He tested positive on a home test today. Symptoms started Saturday/. Symptoms include Fever, dizziness, weakness, cough, nasal congestion and sore throat. He has been taking Tylenol as needed. He denies any nausea or vomiting. He states he is able to keep fluids down. Review of Systems   Constitutional:  Positive for fever. HENT:  Positive for congestion and sore throat. Respiratory:  Positive for cough. Negative for shortness of breath and wheezing. Cardiovascular: Negative. Gastrointestinal: Negative. Musculoskeletal: Negative. Neurological:  Positive for dizziness and headaches. Psychiatric/Behavioral: Negative.             Objective   Patient-Reported Vitals  No data recorded     Physical Exam  [INSTRUCTIONS:  \"[x]\" Indicates a positive item  \"[]\" Indicates a negative item  -- DELETE ALL ITEMS NOT EXAMINED]    Constitutional: [x] Appears well-developed and well-nourished [x] No apparent distress      [] Abnormal -     Mental status: [x] Alert and awake  [x] Oriented to person/place/time [x] Able to follow commands    [] Abnormal -     Eyes:   EOM    [x]  Normal    [] Abnormal -   Sclera  [x]  Normal    [] Abnormal -          Discharge [x]  None visible   []

## 2024-02-20 ENCOUNTER — OFFICE VISIT (OUTPATIENT)
Dept: FAMILY MEDICINE CLINIC | Age: 64
End: 2024-02-20
Payer: COMMERCIAL

## 2024-02-20 VITALS
HEART RATE: 78 BPM | OXYGEN SATURATION: 97 % | DIASTOLIC BLOOD PRESSURE: 78 MMHG | BODY MASS INDEX: 33.19 KG/M2 | WEIGHT: 238 LBS | SYSTOLIC BLOOD PRESSURE: 120 MMHG

## 2024-02-20 DIAGNOSIS — Z79.4 TYPE 2 DIABETES MELLITUS WITHOUT COMPLICATION, WITH LONG-TERM CURRENT USE OF INSULIN (HCC): Primary | ICD-10-CM

## 2024-02-20 DIAGNOSIS — E11.9 TYPE 2 DIABETES MELLITUS WITHOUT COMPLICATION, WITH LONG-TERM CURRENT USE OF INSULIN (HCC): Primary | ICD-10-CM

## 2024-02-20 LAB — HBA1C MFR BLD: 7 %

## 2024-02-20 PROCEDURE — 3051F HG A1C>EQUAL 7.0%<8.0%: CPT | Performed by: NURSE PRACTITIONER

## 2024-02-20 PROCEDURE — 83036 HEMOGLOBIN GLYCOSYLATED A1C: CPT | Performed by: NURSE PRACTITIONER

## 2024-02-20 PROCEDURE — 99213 OFFICE O/P EST LOW 20 MIN: CPT | Performed by: NURSE PRACTITIONER

## 2024-02-20 RX ORDER — INSULIN GLARGINE 100 [IU]/ML
7 INJECTION, SOLUTION SUBCUTANEOUS NIGHTLY
Qty: 5 ADJUSTABLE DOSE PRE-FILLED PEN SYRINGE | Refills: 3 | Status: CANCELLED | OUTPATIENT
Start: 2024-02-20

## 2024-02-20 RX ORDER — INSULIN LISPRO 100 [IU]/ML
INJECTION, SOLUTION INTRAVENOUS; SUBCUTANEOUS
Qty: 1 ADJUSTABLE DOSE PRE-FILLED PEN SYRINGE | Refills: 0 | Status: SHIPPED | OUTPATIENT
Start: 2024-02-20

## 2024-02-20 ASSESSMENT — PATIENT HEALTH QUESTIONNAIRE - PHQ9
2. FEELING DOWN, DEPRESSED OR HOPELESS: 0
1. LITTLE INTEREST OR PLEASURE IN DOING THINGS: 0
SUM OF ALL RESPONSES TO PHQ9 QUESTIONS 1 & 2: 0
SUM OF ALL RESPONSES TO PHQ QUESTIONS 1-9: 0

## 2024-02-20 ASSESSMENT — ENCOUNTER SYMPTOMS: SHORTNESS OF BREATH: 0

## 2024-02-20 NOTE — PROGRESS NOTES
Patient: Ayo Hurley is a 63 y.o. male who presents today with the following Chief Complaint(s):  Chief Complaint   Patient presents with    3 Month Follow-Up     Diabetes          HPI  DM: fastings closer to 130's- after meals   After eating ramen one day was 240 or 260  Sometimes takes humalog if his sugars when eating are elevated. Has not been taking the lantus. He states he eats pastas and breads.     Current Outpatient Medications   Medication Sig Dispense Refill    insulin lispro, 1 Unit Dial, (HUMALOG KWIKPEN) 100 UNIT/ML SOPN 150-199, give 1 unit. 200-249, give 2 units. 250-299, give 3 units. 300-349 give 4 units. 350-399 give 5 units. 1 Adjustable Dose Pre-filled Pen Syringe 0    Continuous Blood Gluc Sensor (FREESTYLE RAVINDRA 2 SENSOR) MISC APPLY/REMOVE ONE DEVICE EVERY 14 DAYS 2 each 1    Continuous Blood Gluc  (FREESTYLE RAVINDRA 2 READER) JODY 1 Device by Does not apply route daily 1 each 0    glucose monitoring (FREESTYLE FREEDOM) kit 1 kit by Does not apply route daily Check blood sugar before meals and at bedtime 1 kit 0    acetaminophen (TYLENOL) 325 MG tablet Take 2 tablets by mouth every 6 hours as needed Indications: OTC      Insulin Pen Needle 32G X 4 MM MISC 1 each by Does not apply route daily (Patient not taking: Reported on 2/20/2024) 100 each 3     No current facility-administered medications for this visit.       Patient's past medical history, surgical history, family history, medications,  andallergies  were all reviewed and updated as appropriate today.      Review of Systems   Constitutional:  Negative for chills and fever.   Respiratory:  Negative for shortness of breath.    Cardiovascular:  Negative for chest pain.   Genitourinary:  Positive for frequency.   Neurological:  Negative for numbness.         Physical Exam  Constitutional:       Appearance: Normal appearance.   HENT:      Head: Normocephalic.      Ears:      Comments: + hearing aids  Eyes:      Conjunctiva/sclera:

## 2024-02-21 LAB
CREAT UR-MCNC: 57.4 MG/DL (ref 39–259)
MICROALBUMIN UR DL<=1MG/L-MCNC: 1.4 MG/DL
MICROALBUMIN/CREAT UR: 24.4 MG/G (ref 0–30)

## 2024-04-25 ENCOUNTER — OFFICE VISIT (OUTPATIENT)
Dept: FAMILY MEDICINE CLINIC | Age: 64
End: 2024-04-25
Payer: COMMERCIAL

## 2024-04-25 VITALS
SYSTOLIC BLOOD PRESSURE: 122 MMHG | OXYGEN SATURATION: 98 % | HEART RATE: 66 BPM | WEIGHT: 236 LBS | BODY MASS INDEX: 32.92 KG/M2 | DIASTOLIC BLOOD PRESSURE: 78 MMHG

## 2024-04-25 DIAGNOSIS — E11.59 TYPE 2 DIABETES MELLITUS WITH OTHER CIRCULATORY COMPLICATION, WITH LONG-TERM CURRENT USE OF INSULIN (HCC): Primary | ICD-10-CM

## 2024-04-25 DIAGNOSIS — Z79.4 TYPE 2 DIABETES MELLITUS WITH OTHER CIRCULATORY COMPLICATION, WITH LONG-TERM CURRENT USE OF INSULIN (HCC): Primary | ICD-10-CM

## 2024-04-25 DIAGNOSIS — R23.8 CHANGE OF SKIN COLOR: ICD-10-CM

## 2024-04-25 PROCEDURE — 99213 OFFICE O/P EST LOW 20 MIN: CPT | Performed by: NURSE PRACTITIONER

## 2024-04-25 PROCEDURE — 3051F HG A1C>EQUAL 7.0%<8.0%: CPT | Performed by: NURSE PRACTITIONER

## 2024-04-25 ASSESSMENT — ENCOUNTER SYMPTOMS: COLOR CHANGE: 1

## 2024-04-25 NOTE — PROGRESS NOTES
Patient: Ayo Hurley is a 63 y.o. male who presents today with the following Chief Complaint(s):  Chief Complaint   Patient presents with    OTHER     Middle toe on left foot, thought was ingrown toe nail and is not, now has swelling on the top of both feet          HPI  Middle toe left foot- noticed a couple months ago it looked red and was peeling- just started looking a little more red- has been using an antifungal cream. Never been painful or draining. No fevers. States he is able to feel his feet and toes       Current Outpatient Medications   Medication Sig Dispense Refill    fluticasone (VERAMYST) 27.5 MCG/SPRAY nasal spray 2 sprays by Each Nostril route daily      insulin lispro, 1 Unit Dial, (HUMALOG KWIKPEN) 100 UNIT/ML SOPN 150-199, give 1 unit. 200-249, give 2 units. 250-299, give 3 units. 300-349 give 4 units. 350-399 give 5 units. 1 Adjustable Dose Pre-filled Pen Syringe 0    Continuous Blood Gluc Sensor (FREESTYLE RAVINDRA 2 SENSOR) MISC APPLY/REMOVE ONE DEVICE EVERY 14 DAYS 2 each 1    Continuous Blood Gluc  (FREESTYLE RAVINDRA 2 READER) JODY 1 Device by Does not apply route daily 1 each 0    glucose monitoring (FREESTYLE FREEDOM) kit 1 kit by Does not apply route daily Check blood sugar before meals and at bedtime 1 kit 0    acetaminophen (TYLENOL) 325 MG tablet Take 2 tablets by mouth every 6 hours as needed Indications: OTC      Insulin Pen Needle 32G X 4 MM MISC 1 each by Does not apply route daily (Patient not taking: Reported on 2/20/2024) 100 each 3     No current facility-administered medications for this visit.       Patient's past medical history, surgical history, family history, medications,  andallergies  were all reviewed and updated as appropriate today.      Review of Systems   Skin:  Positive for color change (middle toe left foot). Negative for wound.   Neurological:  Negative for numbness.         Physical Exam  Vitals and nursing note reviewed.   Constitutional:

## 2024-05-30 ENCOUNTER — TELEPHONE (OUTPATIENT)
Dept: FAMILY MEDICINE CLINIC | Age: 64
End: 2024-05-30

## 2024-05-30 ENCOUNTER — OFFICE VISIT (OUTPATIENT)
Dept: FAMILY MEDICINE CLINIC | Age: 64
End: 2024-05-30
Payer: COMMERCIAL

## 2024-05-30 VITALS
DIASTOLIC BLOOD PRESSURE: 70 MMHG | HEART RATE: 79 BPM | SYSTOLIC BLOOD PRESSURE: 118 MMHG | BODY MASS INDEX: 32.5 KG/M2 | WEIGHT: 233 LBS | OXYGEN SATURATION: 98 % | TEMPERATURE: 98.2 F

## 2024-05-30 DIAGNOSIS — Z79.4 LONG TERM (CURRENT) USE OF INSULIN (HCC): ICD-10-CM

## 2024-05-30 DIAGNOSIS — Z79.4 TYPE 2 DIABETES MELLITUS WITHOUT COMPLICATION, WITH LONG-TERM CURRENT USE OF INSULIN (HCC): ICD-10-CM

## 2024-05-30 DIAGNOSIS — E11.9 TYPE 2 DIABETES MELLITUS WITHOUT COMPLICATION, WITH LONG-TERM CURRENT USE OF INSULIN (HCC): ICD-10-CM

## 2024-05-30 DIAGNOSIS — J30.9 ALLERGIC RHINITIS, UNSPECIFIED SEASONALITY, UNSPECIFIED TRIGGER: Primary | ICD-10-CM

## 2024-05-30 PROCEDURE — 3051F HG A1C>EQUAL 7.0%<8.0%: CPT | Performed by: NURSE PRACTITIONER

## 2024-05-30 PROCEDURE — 99213 OFFICE O/P EST LOW 20 MIN: CPT | Performed by: NURSE PRACTITIONER

## 2024-05-30 RX ORDER — INSULIN LISPRO 100 [IU]/ML
INJECTION, SOLUTION INTRAVENOUS; SUBCUTANEOUS
Qty: 1 ADJUSTABLE DOSE PRE-FILLED PEN SYRINGE | Refills: 0 | Status: SHIPPED | OUTPATIENT
Start: 2024-05-30

## 2024-05-30 ASSESSMENT — ENCOUNTER SYMPTOMS
SHORTNESS OF BREATH: 0
COUGH: 1
WHEEZING: 0
SORE THROAT: 0

## 2024-05-30 NOTE — PROGRESS NOTES
Neurological:  Positive for dizziness. Negative for headaches.         Physical Exam  Constitutional:       Appearance: Normal appearance.   HENT:      Head: Normocephalic and atraumatic.      Right Ear: Tympanic membrane normal.      Left Ear: Tympanic membrane normal.      Mouth/Throat:      Pharynx: No oropharyngeal exudate or posterior oropharyngeal erythema.   Eyes:      Conjunctiva/sclera: Conjunctivae normal.   Cardiovascular:      Rate and Rhythm: Normal rate and regular rhythm.      Pulses: Normal pulses.      Heart sounds: Normal heart sounds.   Pulmonary:      Effort: Pulmonary effort is normal. No respiratory distress.      Breath sounds: Normal breath sounds. No stridor. No wheezing, rhonchi or rales.   Chest:      Chest wall: No tenderness.   Musculoskeletal:      Cervical back: Normal range of motion.   Lymphadenopathy:      Cervical: No cervical adenopathy.   Neurological:      General: No focal deficit present.      Mental Status: He is alert and oriented to person, place, and time.   Psychiatric:         Mood and Affect: Mood normal.         Behavior: Behavior normal.         Thought Content: Thought content normal.         Judgment: Judgment normal.       Vitals:    05/30/24 1006   BP: 118/70   Pulse: 79   Temp: 98.2 °F (36.8 °C)   SpO2: 98%       Assessment:  Encounter Diagnoses   Name Primary?    Type 2 diabetes mellitus without complication, with long-term current use of insulin (HCC)     Long term (current) use of insulin (HCC)     Allergic rhinitis, unspecified seasonality, unspecified trigger Yes       Plan:  1. Type 2 diabetes mellitus without complication, with long-term current use of insulin (HCC)  Continue current insulin use- last A1C was stable  - insulin lispro, 1 Unit Dial, (HUMALOG KWIKPEN) 100 UNIT/ML SOPN; 150-199, give 1 unit. 200-249, give 2 units. 250-299, give 3 units. 300-349 give 4 units. 350-399 give 5 units.  Dispense: 1 Adjustable Dose Pre-filled Pen Syringe; Refill:

## 2024-05-30 NOTE — TELEPHONE ENCOUNTER
Pharmacy requesting clarification    insulin lispro, 1 Unit Dial, (HUMALOG KWIKPEN) 100 UNIT/ML SOPN     Pharmacy needs to know max amount of daily units.    [facility]

## 2024-07-01 DIAGNOSIS — Z79.4 LONG TERM (CURRENT) USE OF INSULIN (HCC): ICD-10-CM

## 2024-07-01 NOTE — TELEPHONE ENCOUNTER
Last Office Visit  -  05/30/2024  Next Office Visit  -  n/a    Last Filled  -    Last UDS -    Contract -

## 2024-07-31 DIAGNOSIS — Z79.4 LONG TERM (CURRENT) USE OF INSULIN (HCC): ICD-10-CM

## 2024-08-14 ENCOUNTER — TELEMEDICINE (OUTPATIENT)
Dept: FAMILY MEDICINE CLINIC | Age: 64
End: 2024-08-14
Payer: COMMERCIAL

## 2024-08-14 DIAGNOSIS — E11.9 TYPE 2 DIABETES MELLITUS WITHOUT COMPLICATION, WITH LONG-TERM CURRENT USE OF INSULIN (HCC): ICD-10-CM

## 2024-08-14 DIAGNOSIS — Z79.4 TYPE 2 DIABETES MELLITUS WITHOUT COMPLICATION, WITH LONG-TERM CURRENT USE OF INSULIN (HCC): ICD-10-CM

## 2024-08-14 DIAGNOSIS — H66.009 ACUTE SUPPURATIVE OTITIS MEDIA WITHOUT SPONTANEOUS RUPTURE OF EAR DRUM, RECURRENCE NOT SPECIFIED, UNSPECIFIED LATERALITY: Primary | ICD-10-CM

## 2024-08-14 PROCEDURE — 99214 OFFICE O/P EST MOD 30 MIN: CPT | Performed by: STUDENT IN AN ORGANIZED HEALTH CARE EDUCATION/TRAINING PROGRAM

## 2024-08-14 PROCEDURE — 3051F HG A1C>EQUAL 7.0%<8.0%: CPT | Performed by: STUDENT IN AN ORGANIZED HEALTH CARE EDUCATION/TRAINING PROGRAM

## 2024-08-14 RX ORDER — INSULIN LISPRO 100 [IU]/ML
INJECTION, SOLUTION INTRAVENOUS; SUBCUTANEOUS
Qty: 1 ADJUSTABLE DOSE PRE-FILLED PEN SYRINGE | Refills: 0 | Status: SHIPPED | OUTPATIENT
Start: 2024-08-14 | End: 2024-08-15

## 2024-08-14 RX ORDER — AMOXICILLIN 875 MG/1
875 TABLET, COATED ORAL 2 TIMES DAILY
Qty: 20 TABLET | Refills: 0 | Status: SHIPPED | OUTPATIENT
Start: 2024-08-14 | End: 2024-08-24

## 2024-08-14 NOTE — PROGRESS NOTES
"    10/28/2020         RE: Bal Junior  [address]  [place] [address]        Dear Colleague,    Thank you for referring your patient, Bal Junior, to the [facility]. Please see a copy of my visit note below.    THORACIC SURGERY FOLLOW UP VISIT    I saw . Bal Junior in follow-up today. The clinical summary follows:     PREOP DIAGNOSIS   Herniated Nissen fundoplication  PROCEDURE   Laparoscopic takedown of Nissen fundoplication, repair of hiatal hernia with redo Nissen fundoplication, upper endoscopy and gastropexy with PEG tube.   DATE OF PROCEDURE  9/25/2020    COMPLICATIONS  None    INTERVAL STUDIES  Esophagram 10/27/2020  IMPRESSION:  1. Status post Nissen fundoplication redo with a small hiatal hernia  extending through the otherwise normally positioned Nissen wrap.  2. Mild esophageal dysmotility.    SUBJECTIVE  I completely went off Protonix and am eating pretty much anything.   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   His wife is also at home.    IMPRESSION No diagnosis found.  63 year-old male recovering from his recent re-do of Nissen fundoplication.   He denies any issues with nausea, bloating or constipation.  He is not using any analgesics.    His incisions are well healed.   We reviewed his xray.  All questions were answered.    After discussing removal of PEG and after care, the PEG was removed without complication.    Upon sitting up, he became light-headed and clammy, saying \"I'm losing it\".    I laid him back and lowered his head, called a rapid response team.   His initial blood pressure was 130/69, HR 72 and O2 sat 93%.    His blood sugar was checked and is 109.  After 30\" of assessing, slowly sitting up and finally standing, he was feeling better.  He was also given several bottles of water.  After stopping in the bathroom (he feels crampy and needed to have a BM), he was " battling a fever for the past 2 days with nausea/dry heaving in addition to some shortness of breath, problems with balance, and worsened hearing.  He states that his ears are not painful, but he feels like he is ill.  He denies sore throat, cough, rhinorrhea, nasal congestion, sinus congestion, constipation, diarrhea, vomiting, wheezing, sick contacts, body aches.  Has had problems with balance in the past that improved after treatment for ear infection.    Patient also has diabetes.  He is on insulin.  He specifically would like a refill of his Humalog KwikPen.  He states this medication works well for him and he does not notice any side effects.    Review of Systems   All other systems reviewed and are negative.         Objective   Patient-Reported Vitals: None  No data recorded     Physical Exam  Cardiovascular:      Comments: Patient appears well-perfused  Pulmonary:      Comments: Patient is not dyspneic to conversation  Neurological:      Mental Status: He is alert.            On this date 8/14/2024 I have spent 22 minutes reviewing previous notes, test results and face to face (virtual) with the patient discussing the diagnosis and importance of compliance with the treatment plan as well as documenting on the day of the visit.    --Kenneth Vasquez MD          released home.       PLAN  I spent a total of 40 minutes with Mr. Bal Junior, more than 50% of which were spent in counseling, coordination of care, and face-to-face time. I reviewed the plan as follows:  After care instructions were written out on the after visit summary and given to patient.  Call or return with any new concerns.  All questions were answered and the patient and present family were in agreement with the plan.  I appreciate the opportunity to participate in the care of your patient and will keep you updated.  Sincerely,  ROSY Benton, CNS  Answers for HPI/ROS submitted by the patient on 10/27/2020   General Symptoms: No  Skin Symptoms: No  HENT Symptoms: No  EYE SYMPTOMS: No  HEART SYMPTOMS: No  LUNG SYMPTOMS: Yes  INTESTINAL SYMPTOMS: No  URINARY SYMPTOMS: No  REPRODUCTIVE SYMPTOMS: No  SKELETAL SYMPTOMS: No  BLOOD SYMPTOMS: No  NERVOUS SYSTEM SYMPTOMS: No  MENTAL HEALTH SYMPTOMS: No  Cough: Yes  Sputum or phlegm: Yes  Coughing up blood: No  Difficulty breating or shortness of breath: No  Snoring: No  Wheezing: No  Difficulty breathing on exertion: No  Nighttime Cough: No  Difficulty breathing when lying flat: No        Again, thank you for allowing me to participate in the care of your patient.        Sincerely,        ROSY Rasmussen CNS

## 2024-08-15 ENCOUNTER — TELEPHONE (OUTPATIENT)
Dept: FAMILY MEDICINE CLINIC | Age: 64
End: 2024-08-15

## 2024-08-15 DIAGNOSIS — Z79.4 TYPE 2 DIABETES MELLITUS WITHOUT COMPLICATION, WITH LONG-TERM CURRENT USE OF INSULIN (HCC): ICD-10-CM

## 2024-08-15 DIAGNOSIS — E11.9 TYPE 2 DIABETES MELLITUS WITHOUT COMPLICATION, WITH LONG-TERM CURRENT USE OF INSULIN (HCC): ICD-10-CM

## 2024-08-15 RX ORDER — INSULIN LISPRO 100 [IU]/ML
INJECTION, SOLUTION INTRAVENOUS; SUBCUTANEOUS
Qty: 3 ML | Refills: 2 | Status: SHIPPED | OUTPATIENT
Start: 2024-08-15

## 2024-08-15 RX ORDER — INSULIN LISPRO 100 [IU]/ML
INJECTION, SOLUTION INTRAVENOUS; SUBCUTANEOUS
Qty: 3 ML | Refills: 2 | Status: SHIPPED | OUTPATIENT
Start: 2024-08-15 | End: 2024-08-15 | Stop reason: SDUPTHER

## 2024-08-15 NOTE — TELEPHONE ENCOUNTER
Kim from [facility] stated that she needs clarification on rx for   insulin lispro, 1 Unit Dial, (HUMALOG/ADMELOG) 100 UNIT/ML SOPN     She states that she doesn't see how many times a day the pt needs to take this    She can be reached at [phone]

## 2024-08-27 DIAGNOSIS — Z79.4 LONG TERM (CURRENT) USE OF INSULIN (HCC): ICD-10-CM

## 2024-09-13 ENCOUNTER — PATIENT MESSAGE (OUTPATIENT)
Dept: FAMILY MEDICINE CLINIC | Age: 64
End: 2024-09-13

## 2024-09-13 DIAGNOSIS — Z79.4 LONG TERM (CURRENT) USE OF INSULIN (HCC): ICD-10-CM

## 2024-09-13 RX ORDER — BLOOD-GLUCOSE METER
1 KIT MISCELLANEOUS DAILY
Qty: 100 EACH | Refills: 3 | Status: SHIPPED | OUTPATIENT
Start: 2024-09-13

## 2024-10-02 ENCOUNTER — PATIENT MESSAGE (OUTPATIENT)
Dept: FAMILY MEDICINE CLINIC | Age: 64
End: 2024-10-02

## 2024-10-02 RX ORDER — POLYMYXIN B SULFATE AND TRIMETHOPRIM 1; 10000 MG/ML; [USP'U]/ML
1 SOLUTION OPHTHALMIC EVERY 6 HOURS
Qty: 2 ML | Refills: 0 | Status: SHIPPED | OUTPATIENT
Start: 2024-10-02 | End: 2024-10-12

## 2024-11-13 DIAGNOSIS — H66.009 ACUTE SUPPURATIVE OTITIS MEDIA WITHOUT SPONTANEOUS RUPTURE OF EAR DRUM, RECURRENCE NOT SPECIFIED, UNSPECIFIED LATERALITY: ICD-10-CM

## 2024-11-13 RX ORDER — AMOXICILLIN 875 MG/1
875 TABLET, COATED ORAL 2 TIMES DAILY
Qty: 20 TABLET | Refills: 0 | OUTPATIENT
Start: 2024-11-13 | End: 2024-11-23

## 2024-12-04 ENCOUNTER — PATIENT MESSAGE (OUTPATIENT)
Dept: FAMILY MEDICINE CLINIC | Age: 64
End: 2024-12-04

## 2024-12-31 ENCOUNTER — TELEMEDICINE (OUTPATIENT)
Dept: FAMILY MEDICINE CLINIC | Age: 64
End: 2024-12-31
Payer: COMMERCIAL

## 2024-12-31 ENCOUNTER — TELEPHONE (OUTPATIENT)
Dept: FAMILY MEDICINE CLINIC | Age: 64
End: 2024-12-31

## 2024-12-31 DIAGNOSIS — E11.65 TYPE 2 DIABETES MELLITUS WITH HYPERGLYCEMIA, WITH LONG-TERM CURRENT USE OF INSULIN (HCC): ICD-10-CM

## 2024-12-31 DIAGNOSIS — Z79.4 TYPE 2 DIABETES MELLITUS WITH HYPERGLYCEMIA, WITH LONG-TERM CURRENT USE OF INSULIN (HCC): ICD-10-CM

## 2024-12-31 DIAGNOSIS — J02.9 SORE THROAT: Primary | ICD-10-CM

## 2024-12-31 LAB
INFLUENZA A ANTIGEN, POC: NEGATIVE
INFLUENZA B ANTIGEN, POC: NEGATIVE
LOT EXPIRE DATE: NORMAL
LOT KIT NUMBER: NORMAL
SARS-COV-2, POC: NORMAL
VALID INTERNAL CONTROL: NORMAL
VENDOR AND KIT NAME POC: NORMAL

## 2024-12-31 PROCEDURE — 3051F HG A1C>EQUAL 7.0%<8.0%: CPT | Performed by: NURSE PRACTITIONER

## 2024-12-31 PROCEDURE — 1123F ACP DISCUSS/DSCN MKR DOCD: CPT | Performed by: NURSE PRACTITIONER

## 2024-12-31 PROCEDURE — 87428 SARSCOV & INF VIR A&B AG IA: CPT | Performed by: NURSE PRACTITIONER

## 2024-12-31 PROCEDURE — 99214 OFFICE O/P EST MOD 30 MIN: CPT | Performed by: NURSE PRACTITIONER

## 2024-12-31 RX ORDER — TIRZEPATIDE 2.5 MG/.5ML
2.5 INJECTION, SOLUTION SUBCUTANEOUS WEEKLY
Qty: 2 ML | Refills: 0 | Status: SHIPPED | OUTPATIENT
Start: 2024-12-31

## 2024-12-31 ASSESSMENT — ENCOUNTER SYMPTOMS
SHORTNESS OF BREATH: 0
COUGH: 1
SINUS PRESSURE: 1
WHEEZING: 0
SORE THROAT: 1

## 2024-12-31 NOTE — TELEPHONE ENCOUNTER
----- Message from Lyubov HUDSON sent at 12/31/2024 10:21 AM EST -----  Regarding: ECC Message to Provider  ECC Message to Provider    Relationship to Patient: Self     Additional Information The patient want to inform the office  before he go to the appointment for today ( December 31, 2024 at 11:40 am ) that he already tested himself for COVID and the result is negative.  --------------------------------------------------------------------------------------------------------------------------    Call Back Information: OK to leave message on voicemail  Preferred Call Back Number:  [phone]

## 2024-12-31 NOTE — PROGRESS NOTES
Ayo Hurley (:  1960) is a Established patient, presenting virtually for evaluation of the following:    Assessment & Plan   Below is the assessment and plan developed based on review of pertinent history, physical exam, labs, studies, and medications.  Assessment & Plan  Sore throat       Orders:    POCT COVID-19 & Influenza A/B  negative COVID, negative influenza A/B  Suggested increase fluids, warm salt water gargles, cough drops, tylenol   Type 2 diabetes mellitus with hyperglycemia, with long-term current use of insulin ([facility])       Orders:    Tirzepatide (MOUNJARO) 2.5 MG/0.5ML SOAJ; Inject 2.5 mLs into the skin once a week  discussed increasing proteins, vegetables  Increase exercise, increase water   Limit carbs/sweets       No follow-ups on file.       Subjective   HPI  Last night throat started hurting, seems a little better today after he had a cough drop last night, earlier had aching in joints- knees and shoulders.   COVID negative at home.   Congestion has been going on for a month now- no colored mucous   Cough is nonproductive    DM: admits he does not eat good- takes insulin( humalog) depending what he eats   Interested in mounjaro to help with his diabetes    Review of Systems   Constitutional:  Negative for chills, diaphoresis and fever.   HENT:  Positive for congestion (about a month), sinus pressure, sneezing and sore throat. Negative for ear pain.    Respiratory:  Positive for cough. Negative for shortness of breath and wheezing.    Musculoskeletal:  Positive for myalgias.   Neurological:  Negative for headaches.          Objective   Patient-Reported Vitals  No data recorded     Physical Exam  Vitals and nursing note reviewed.   Constitutional:       Appearance: Normal appearance. He is well-developed.   HENT:      Head: Normocephalic and atraumatic.      Nose: Nose normal.   Eyes:      Conjunctiva/sclera: Conjunctivae normal.   Cardiovascular:      Rate and Rhythm: Normal rate and

## 2025-01-08 ENCOUNTER — PATIENT MESSAGE (OUTPATIENT)
Dept: FAMILY MEDICINE CLINIC | Age: 65
End: 2025-01-08

## 2025-01-08 RX ORDER — AMOXICILLIN 875 MG/1
875 TABLET, COATED ORAL 2 TIMES DAILY
Qty: 20 TABLET | Refills: 0 | Status: SHIPPED | OUTPATIENT
Start: 2025-01-08 | End: 2025-01-18

## 2025-01-20 DIAGNOSIS — E11.65 TYPE 2 DIABETES MELLITUS WITH HYPERGLYCEMIA, WITH LONG-TERM CURRENT USE OF INSULIN (HCC): Primary | ICD-10-CM

## 2025-01-20 DIAGNOSIS — Z79.4 TYPE 2 DIABETES MELLITUS WITH HYPERGLYCEMIA, WITH LONG-TERM CURRENT USE OF INSULIN (HCC): Primary | ICD-10-CM

## 2025-01-26 ENCOUNTER — APPOINTMENT (OUTPATIENT)
Dept: GENERAL RADIOLOGY | Age: 65
End: 2025-01-26
Payer: COMMERCIAL

## 2025-01-26 ENCOUNTER — HOSPITAL ENCOUNTER (EMERGENCY)
Age: 65
Discharge: HOME | End: 2025-01-26
Attending: EMERGENCY MEDICINE
Payer: COMMERCIAL

## 2025-01-26 ENCOUNTER — APPOINTMENT (OUTPATIENT)
Dept: CT IMAGING | Age: 65
End: 2025-01-26
Payer: COMMERCIAL

## 2025-01-26 VITALS
RESPIRATION RATE: 16 BRPM | HEART RATE: 80 BPM | HEIGHT: 73 IN | SYSTOLIC BLOOD PRESSURE: 127 MMHG | OXYGEN SATURATION: 98 % | BODY MASS INDEX: 29.42 KG/M2 | TEMPERATURE: 98.5 F | DIASTOLIC BLOOD PRESSURE: 78 MMHG | WEIGHT: 222 LBS

## 2025-01-26 DIAGNOSIS — S82.891A CLOSED FRACTURE OF RIGHT ANKLE, INITIAL ENCOUNTER: Primary | ICD-10-CM

## 2025-01-26 PROCEDURE — 73700 CT LOWER EXTREMITY W/O DYE: CPT

## 2025-01-26 PROCEDURE — 73600 X-RAY EXAM OF ANKLE: CPT

## 2025-01-26 PROCEDURE — 6360000002 HC RX W HCPCS: Performed by: EMERGENCY MEDICINE

## 2025-01-26 PROCEDURE — 99285 EMERGENCY DEPT VISIT HI MDM: CPT

## 2025-01-26 PROCEDURE — 29515 APPLICATION SHORT LEG SPLINT: CPT

## 2025-01-26 PROCEDURE — 27825 TREAT LOWER LEG FRACTURE: CPT

## 2025-01-26 PROCEDURE — 27788 TREATMENT OF ANKLE FRACTURE: CPT

## 2025-01-26 RX ORDER — IBUPROFEN 600 MG/1
600 TABLET, FILM COATED ORAL EVERY 6 HOURS PRN
Qty: 40 TABLET | Refills: 0 | Status: SHIPPED | OUTPATIENT
Start: 2025-01-26

## 2025-01-26 RX ORDER — HYDROCODONE BITARTRATE AND ACETAMINOPHEN 5; 325 MG/1; MG/1
1 TABLET ORAL EVERY 6 HOURS PRN
Qty: 10 TABLET | Refills: 0 | Status: SHIPPED | OUTPATIENT
Start: 2025-01-26 | End: 2025-01-29

## 2025-01-26 RX ADMIN — PROPOFOL 50 MG: 10 INJECTION, EMULSION INTRAVENOUS at 16:28

## 2025-01-26 ASSESSMENT — PAIN DESCRIPTION - LOCATION: LOCATION: ANKLE

## 2025-01-26 ASSESSMENT — PAIN SCALES - GENERAL: PAINLEVEL_OUTOF10: 3

## 2025-01-26 ASSESSMENT — PAIN DESCRIPTION - ORIENTATION: ORIENTATION: RIGHT

## 2025-01-26 ASSESSMENT — PAIN - FUNCTIONAL ASSESSMENT: PAIN_FUNCTIONAL_ASSESSMENT: 0-10

## 2025-01-26 ASSESSMENT — PAIN DESCRIPTION - PAIN TYPE: TYPE: ACUTE PAIN

## 2025-01-26 NOTE — ED NOTES
100 mg of propofol wasted after 100 mg used during conscious sedation.   Medication wasted with DEIDRA Faust.

## 2025-01-27 SDOH — HEALTH STABILITY: PHYSICAL HEALTH
ON AVERAGE, HOW MANY DAYS PER WEEK DO YOU ENGAGE IN MODERATE TO STRENUOUS EXERCISE (LIKE A BRISK WALK)?: PATIENT DECLINED

## 2025-01-27 NOTE — ED PROVIDER NOTES
[facility] EMERGENCY DEPARTMENT     EMERGENCY DEPARTMENT ENCOUNTER            Pt Name: Ayo Hurley   MRN: [id number]   Birthdate 1960   Date of evaluation: 1/26/2025   Provider: Reji Forte II, DO   PCP: Obdulia Alan APRN - CNP   Note Started: 7:54 PM EST 1/26/25          CHIEF COMPLAINT     Chief Complaint   Patient presents with    Ankle Injury     Slipped on ice while walking dog 30 minutes PTA.   Right ankle injury.    100 mcg of fentanyl and 4 mg of zofran given.               HISTORY OF PRESENT ILLNESS:   History from : Patient   Limitations to history : None     Ayo Hurley is a 64 y.o. male who presents to the emergency department complaining of right ankle injury.  Patient states that he was walking his dog and slipped on the ice injuring his right ankle 30 minutes prior to arrival.  Patient noted deformity and was unable to ambulate.  Ambulance was contacted and provided fentanyl/Zofran and route.  Patient denies hitting his head or any loss of consciousness.  No additional injury noted.    Nursing Notes were all reviewed and agreed with, or any disagreements were addressed in the HPI.     REVIEW OF SYSTEMS :    Positives and Pertinent negatives as per HPI.      MEDICAL HISTORY   has a past medical history of Allergic rhinitis, Chronic back pain, Diabetes mellitus ([facility]) (09/19/2022), Hearing loss (1989), Pulmonary embolism ([facility]) (09/19/2022), and Type 2 diabetes mellitus without complication ([facility]) (9/19/22).    Past Surgical History:   Procedure Laterality Date    COLONOSCOPY  6/12/2013    polyp,divert    HAND SURGERY Left 2012    MASTECTOMY Right     for lumps    MASTECTOMY Left       CURRENTMEDICATIONS       Discharge Medication List as of 1/26/2025  5:33 PM        CONTINUE these medications which have NOT CHANGED    Details   Tirzepatide 5 MG/0.5ML SOAJ Inject 5 mg into the skin every 7 days, Disp-2 mL, R-0Increase doseNormal      Continuous Glucose Sensor (FREESTYLE RAVNIDRA

## 2025-01-28 ENCOUNTER — OFFICE VISIT (OUTPATIENT)
Dept: ORTHOPEDIC SURGERY | Age: 65
End: 2025-01-28
Payer: COMMERCIAL

## 2025-01-28 ENCOUNTER — PREP FOR PROCEDURE (OUTPATIENT)
Dept: ORTHOPEDIC SURGERY | Age: 65
End: 2025-01-28

## 2025-01-28 VITALS — HEIGHT: 73 IN | WEIGHT: 222 LBS | BODY MASS INDEX: 29.42 KG/M2

## 2025-01-28 DIAGNOSIS — Z01.818 PREOP TESTING: Primary | ICD-10-CM

## 2025-01-28 DIAGNOSIS — Z01.818 PREOP TESTING: ICD-10-CM

## 2025-01-28 PROBLEM — S82.851A CLOSED TRIMALLEOLAR FRACTURE OF RIGHT ANKLE: Status: ACTIVE | Noted: 2025-01-28

## 2025-01-28 LAB
ANION GAP SERPL CALCULATED.3IONS-SCNC: 12 MMOL/L (ref 3–16)
BASOPHILS # BLD: 0 K/UL (ref 0–0.2)
BASOPHILS NFR BLD: 0.4 %
BUN SERPL-MCNC: 19 MG/DL (ref 7–20)
CALCIUM SERPL-MCNC: 9.5 MG/DL (ref 8.3–10.6)
CHLORIDE SERPL-SCNC: 106 MMOL/L (ref 99–110)
CO2 SERPL-SCNC: 25 MMOL/L (ref 21–32)
CREAT SERPL-MCNC: 0.8 MG/DL (ref 0.8–1.3)
DEPRECATED RDW RBC AUTO: 13.1 % (ref 12.4–15.4)
EOSINOPHIL # BLD: 0.1 K/UL (ref 0–0.6)
EOSINOPHIL NFR BLD: 0.7 %
EST. AVERAGE GLUCOSE BLD GHB EST-MCNC: 134.1 MG/DL
GFR SERPLBLD CREATININE-BSD FMLA CKD-EPI: >90 ML/MIN/{1.73_M2}
GLUCOSE SERPL-MCNC: 101 MG/DL (ref 70–99)
HBA1C MFR BLD: 6.3 %
HCT VFR BLD AUTO: 45 % (ref 40.5–52.5)
HGB BLD-MCNC: 14.9 G/DL (ref 13.5–17.5)
INR PPP: 0.96 (ref 0.85–1.15)
LYMPHOCYTES # BLD: 2.4 K/UL (ref 1–5.1)
LYMPHOCYTES NFR BLD: 24.5 %
MCH RBC QN AUTO: 29.9 PG (ref 26–34)
MCHC RBC AUTO-ENTMCNC: 33.2 G/DL (ref 31–36)
MCV RBC AUTO: 90.2 FL (ref 80–100)
MONOCYTES # BLD: 1 K/UL (ref 0–1.3)
MONOCYTES NFR BLD: 9.7 %
NEUTROPHILS # BLD: 6.5 K/UL (ref 1.7–7.7)
NEUTROPHILS NFR BLD: 64.7 %
PLATELET # BLD AUTO: 206 K/UL (ref 135–450)
PMV BLD AUTO: 9.1 FL (ref 5–10.5)
POTASSIUM SERPL-SCNC: 4.9 MMOL/L (ref 3.5–5.1)
PROTHROMBIN TIME: 13 SEC (ref 11.9–14.9)
RBC # BLD AUTO: 5 M/UL (ref 4.2–5.9)
SODIUM SERPL-SCNC: 143 MMOL/L (ref 136–145)
WBC # BLD AUTO: 10 K/UL (ref 4–11)

## 2025-01-28 PROCEDURE — 1123F ACP DISCUSS/DSCN MKR DOCD: CPT | Performed by: PHYSICIAN ASSISTANT

## 2025-01-28 PROCEDURE — 99205 OFFICE O/P NEW HI 60 MIN: CPT | Performed by: PHYSICIAN ASSISTANT

## 2025-01-28 NOTE — PROGRESS NOTES
Chief Complaint    Ankle Injury (N Right Ankle)      History of Present Illness:  Ayo Hurley is a 64 y.o. male who presents to the office today for a new problem.  Patient here with a chief complaint of right ankle pain.  Patient was seen in the emergency room on 1/26/2025.  He suffered a fall walking his dog.  He was diagnosed with a trimalleolar ankle fracture and had a closed reduction.  His pain is global in nature.  Increased pain with activities.  He is a diabetic and does take Mounjaro.  This is a once a week dosing.    Medical History:  Past Medical History:   Diagnosis Date    Allergic rhinitis     spring    Chronic back pain     Hx HNP    Diabetes mellitus ([facility]) 09/19/2022    type 2    Hearing loss 1989    Pulmonary embolism ([facility]) 09/19/2022    Type 2 diabetes mellitus without complication ([facility]) 9/19/22     Patient Active Problem List    Diagnosis Date Noted    Acute pulmonary embolism without acute cor pulmonale, unspecified pulmonary embolism type ([facility]) 09/20/2022    New onset type 2 diabetes mellitus ([facility]) 09/20/2022    Sensorineural hearing loss, bilateral 02/14/2020    Umbilical hernia without obstruction and without gangrene 02/14/2020    Allergic rhinitis 09/22/2010     Past Surgical History:   Procedure Laterality Date    COLONOSCOPY  6/12/2013    polyp,divert    HAND SURGERY Left 2012    MASTECTOMY Right     for lumps    MASTECTOMY Left      Family History   Problem Relation Age of Onset    Diabetes Mother     Hearing Loss Mother     Vision Loss Mother     Cancer Father         lung CA    Heart Disease Father         CABG    Hearing Loss Father     Vision Loss Father     Stroke Paternal Aunt      Social History     Socioeconomic History    Marital status:      Spouse name: None    Number of children: None    Years of education: None    Highest education level: None   Tobacco Use    Smoking status: Never    Smokeless tobacco: Never   Substance and Sexual Activity    Alcohol use: Not

## 2025-01-29 LAB
BACTERIA UR CULT: NORMAL
BACTERIA URNS QL MICRO: NORMAL /HPF
BILIRUB UR QL STRIP.AUTO: NEGATIVE
CLARITY UR: CLEAR
COLOR UR: YELLOW
EPI CELLS #/AREA URNS AUTO: 1 /HPF (ref 0–5)
GLUCOSE UR STRIP.AUTO-MCNC: NEGATIVE MG/DL
HGB UR QL STRIP.AUTO: NEGATIVE
HYALINE CASTS #/AREA URNS AUTO: 1 /LPF (ref 0–8)
KETONES UR STRIP.AUTO-MCNC: NEGATIVE MG/DL
LEUKOCYTE ESTERASE UR QL STRIP.AUTO: ABNORMAL
NITRITE UR QL STRIP.AUTO: NEGATIVE
PH UR STRIP.AUTO: 6 [PH] (ref 5–8)
PROT UR STRIP.AUTO-MCNC: NEGATIVE MG/DL
RBC CLUMPS #/AREA URNS AUTO: 1 /HPF (ref 0–4)
SP GR UR STRIP.AUTO: 1.02 (ref 1–1.03)
UA DIPSTICK W REFLEX MICRO PNL UR: YES
URN SPEC COLLECT METH UR: ABNORMAL
UROBILINOGEN UR STRIP-ACNC: 1 E.U./DL
WBC #/AREA URNS AUTO: 1 /HPF (ref 0–5)

## 2025-01-29 RX ORDER — COLLAGEN, HYDROLYSATE (BOVINE) 100 %
POWDER (GRAM) MISCELLANEOUS
COMMUNITY

## 2025-01-29 NOTE — PROGRESS NOTES
Surgery Date and Time:  2/3/25 @ to be called from surgeon's office     Arrival Time:        The instructions given when and if a patient needs to stop oral intake prior to surgery varies. Follow the instructions you were      given by your Surgeon or RN during the Pre-op call.      __X__Do not eat or drink anything after Midnight the night before the surgery. NO gum, mints, candy or ice chips the day of surgery.        Only take the following medications with a small sip of water the morning of surgery:  none        Aspirin, Ibuprofen, Advil, Naproxen, Vitamin E and other Anti-inflammatory products and supplements should be stopped       for 5 -7days before surgery or as directed by your physician. Ibuprofen last dose 1/26/25.        NO DIABETES MEDICATIONS DAY OF SURGERY.  LAST DOSE TIRZEPATIDE (MOUNJARO) 1/26/25.                 - If you take a long acting-insulin, take your normal dose the night before surgery & half the dose if taken in the morning.     - Do not smoke or vape, and do not drink any alcoholic beverages 24 hours prior to surgery, this includes NA Beer. Refrain from using     any recreational drugs, including non-prescribed prescription drugs.     -You may brush your teeth and gargle the morning of surgery.  DO NOT SWALLOW WATER.    -You MUST plan for a responsible adult to stay on site while you are here and take you home after your surgery. You will not be allowed                to leave alone or drive yourself home. It is requested someone stay with you the first 24 hrs. Your surgery will be cancelled if you do not                have a ride home with a responsible adult.    -A parent/legal guardian must accompany a child scheduled for surgery and plan to stay at the hospital until the child                is discharged. Please do not bring other children with you.    -Please wear simple, loose-fitting clothing to the hospital. Do not bring valuables (money, credit cards, checkbooks, etc.)

## 2025-01-29 NOTE — PROGRESS NOTES
Ayo Hurley    Age 64 y.o.    male    1960    MRN [id number]    2/3/2025  Arrival Time_____________  OR Time____________133 Min     Procedure(s):  OPEN REDUCTION INTERNAL FIXATION RIGHT ANKLE, TRIMALLEOLAR WITH  POSTERIOR LIP                        DANNY                      General   Surgeon(s):  Olvin Salter, MD      DAY ADMIT ___  SDS/OP ___  OUTPT IN BED ___        Phone [phone] (home)                  PCP _____________________ Phone_________________ Epic ( ) Epic CE ( ) Appt ________    NOTES: _________________________________________ Consult/Cardio _______________    ____________________________________________________________________________    ____________________________________________________________________________  PAT APPT DATE:________ TIME: ________  FAXED QAD: _______  (__) H&P w/ Hospitalist    (__) PAT orders in EPIC    (__) Meet with PAT nurse  __________________________________________________________________________  Preop Nurse phone screen complete: _____________  (__) CBC     (__) W/ DIFF ___________  (__) CT CHEST  __________   (__) Hgb A1C    ___________  (__) CHEST X RAY   __________  (__) LIPID PROFILE  ___________  (__) EKG   __________  (__) PT-INR / APTT  ___________  (__) PFT's   __________  (__) BMP   ___________  (__) CAROTIDS  __________  (__) CMP   ___________  (__) VEIN MAPPING  __________  (__) U/A   ___________  ( X ) HISTORY & PHYSICAL __________  (__) URINE C & S  ___________  (__) CARDIAC CLEARANCE __________  (__) U/A W/ FLEX  ___________  (__) PULM. CLEARANCE __________  (__) SERUM PREGNANCY ___________  (__) Preop Orders in EPIC __________  (__) TYPE & SCREEN __________repeat ( ) (__)  __________________ __________  (__) Albumin   ___________  (__)  __________________ __________  (__) TRANSFERRIN  ___________  (__)  __________________ __________  (__) LIVER PROFILE  ___________  (__) URINE PREG DOS __________  (__) MRSA NASAL SWAB ___________  (__) BLOOD

## 2025-01-30 ENCOUNTER — TELEPHONE (OUTPATIENT)
Dept: ORTHOPEDIC SURGERY | Age: 65
End: 2025-01-30

## 2025-01-30 ENCOUNTER — OFFICE VISIT (OUTPATIENT)
Dept: FAMILY MEDICINE CLINIC | Age: 65
End: 2025-01-30
Payer: COMMERCIAL

## 2025-01-30 VITALS
HEART RATE: 91 BPM | DIASTOLIC BLOOD PRESSURE: 70 MMHG | OXYGEN SATURATION: 98 % | WEIGHT: 222 LBS | BODY MASS INDEX: 29.29 KG/M2 | SYSTOLIC BLOOD PRESSURE: 108 MMHG

## 2025-01-30 DIAGNOSIS — S82.851K CLOSED TRIMALLEOLAR FRACTURE OF RIGHT ANKLE WITH NONUNION, SUBSEQUENT ENCOUNTER: Primary | ICD-10-CM

## 2025-01-30 DIAGNOSIS — Z01.818 PRE-OP EXAM: ICD-10-CM

## 2025-01-30 PROCEDURE — 93000 ELECTROCARDIOGRAM COMPLETE: CPT | Performed by: NURSE PRACTITIONER

## 2025-01-30 PROCEDURE — 99214 OFFICE O/P EST MOD 30 MIN: CPT | Performed by: NURSE PRACTITIONER

## 2025-01-30 PROCEDURE — 1123F ACP DISCUSS/DSCN MKR DOCD: CPT | Performed by: NURSE PRACTITIONER

## 2025-01-30 SDOH — ECONOMIC STABILITY: FOOD INSECURITY: WITHIN THE PAST 12 MONTHS, THE FOOD YOU BOUGHT JUST DIDN'T LAST AND YOU DIDN'T HAVE MONEY TO GET MORE.: NEVER TRUE

## 2025-01-30 SDOH — ECONOMIC STABILITY: FOOD INSECURITY: WITHIN THE PAST 12 MONTHS, YOU WORRIED THAT YOUR FOOD WOULD RUN OUT BEFORE YOU GOT MONEY TO BUY MORE.: NEVER TRUE

## 2025-01-30 ASSESSMENT — PATIENT HEALTH QUESTIONNAIRE - PHQ9
SUM OF ALL RESPONSES TO PHQ QUESTIONS 1-9: 0
2. FEELING DOWN, DEPRESSED OR HOPELESS: NOT AT ALL
SUM OF ALL RESPONSES TO PHQ QUESTIONS 1-9: 0
SUM OF ALL RESPONSES TO PHQ9 QUESTIONS 1 & 2: 0
SUM OF ALL RESPONSES TO PHQ QUESTIONS 1-9: 0
1. LITTLE INTEREST OR PLEASURE IN DOING THINGS: NOT AT ALL
SUM OF ALL RESPONSES TO PHQ QUESTIONS 1-9: 0

## 2025-01-30 NOTE — TELEPHONE ENCOUNTER
Surgery 02/03/25  [facility] 12:30 pm   ARRIVAL 10:15  am       LM Please Call Back To Confirm  [phone]        CONFIRMED

## 2025-01-30 NOTE — PROGRESS NOTES
atraumatic,external ears without lesions, oral pharynx with moist mucus membranes, tonsils without erythema or exudates, gums normal and good dentition.    Neck:  Supple, symmetrical, trachea midline, no adenopathy, thyroid symmetric, not enlarged and no tenderness, skin normal    Heart:  Normal apical impulse, regular rate and rhythm, normal S1 and S2, no S3 or S4, and no murmur noted    Lungs:  No increased work of breathing, good air exchange, clear to auscultation bilaterally, no crackles or wheezing    Abdomen:   normal bowel sounds, soft, non-distended, non-tender, + umbilical hernia- soft and reducible, no hepatosplenomegally    Extremities:  No clubbing, cyanosis, or edema, right ankle- lower leg wrapped     NEUROLOGIC:  Awake, alert, oriented to name, place and time.  Cranial nerves II-XII are grossly intact.  Motor is 5 out of 5 bilaterally.        DATA:  EKG:  Date:  1/30/2025  I have reviewed EKG with the following interpretation: sinus rhythm  Impression:  normal        ASSESSMENT AND PLAN:  1.  Patient is a 64 y.o. male with above specified procedure planned on 2/3/2025 with Dr. Olvin Salter at [facility].  They will not require cardiology evaluation prior to procedure.   2. Stop ASA/NSAIDS medications 7-10 days prior to procedure.  3.Patient is cleared for surgery  4.Preop has been faxed to Dr. Olvin Salter   office    Obdulia Alan, APRN - CNP    [address] [place] [address] [phone]    Time spent: 34 min

## 2025-01-31 ENCOUNTER — TELEPHONE (OUTPATIENT)
Dept: ORTHOPEDIC SURGERY | Age: 65
End: 2025-01-31

## 2025-01-31 ENCOUNTER — ANESTHESIA EVENT (OUTPATIENT)
Dept: OPERATING ROOM | Age: 65
End: 2025-01-31
Payer: COMMERCIAL

## 2025-01-31 NOTE — TELEPHONE ENCOUNTER
General Question     Subject: REQUESTING A CALL FROM [employer] ABOUT FMLA PAPERWORK.  Patient and /or Facility Request: Ayo Hurley   Contact Number: [phone]

## 2025-01-31 NOTE — TELEPHONE ENCOUNTER
RC/ Spoke with patient     No Form received at this time    Will fax/ or email   [phone]  [email or web address]

## 2025-02-03 ENCOUNTER — APPOINTMENT (OUTPATIENT)
Dept: GENERAL RADIOLOGY | Age: 65
End: 2025-02-03
Attending: ORTHOPAEDIC SURGERY
Payer: COMMERCIAL

## 2025-02-03 ENCOUNTER — ANESTHESIA (OUTPATIENT)
Dept: OPERATING ROOM | Age: 65
End: 2025-02-03
Payer: COMMERCIAL

## 2025-02-03 ENCOUNTER — HOSPITAL ENCOUNTER (OUTPATIENT)
Age: 65
Setting detail: OUTPATIENT SURGERY
Discharge: HOME | End: 2025-02-03
Attending: ORTHOPAEDIC SURGERY | Admitting: ORTHOPAEDIC SURGERY
Payer: COMMERCIAL

## 2025-02-03 VITALS
OXYGEN SATURATION: 93 % | HEART RATE: 79 BPM | RESPIRATION RATE: 4 BRPM | SYSTOLIC BLOOD PRESSURE: 122 MMHG | DIASTOLIC BLOOD PRESSURE: 79 MMHG | HEIGHT: 73 IN | WEIGHT: 222 LBS | TEMPERATURE: 97.4 F | BODY MASS INDEX: 29.42 KG/M2

## 2025-02-03 DIAGNOSIS — S82.851D CLOSED TRIMALLEOLAR FRACTURE OF RIGHT ANKLE WITH ROUTINE HEALING, SUBSEQUENT ENCOUNTER: Primary | ICD-10-CM

## 2025-02-03 DIAGNOSIS — S82.899D CLOSED FRACTURE OF ANKLE WITH ROUTINE HEALING, UNSPECIFIED LATERALITY, SUBSEQUENT ENCOUNTER: Primary | ICD-10-CM

## 2025-02-03 LAB
GLUCOSE BLD-MCNC: 113 MG/DL (ref 70–99)
GLUCOSE BLD-MCNC: 120 MG/DL (ref 70–99)
PERFORMED ON: ABNORMAL
PERFORMED ON: ABNORMAL

## 2025-02-03 PROCEDURE — 7100000000 HC PACU RECOVERY - FIRST 15 MIN: Performed by: ORTHOPAEDIC SURGERY

## 2025-02-03 PROCEDURE — 3600000004 HC SURGERY LEVEL 4 BASE: Performed by: ORTHOPAEDIC SURGERY

## 2025-02-03 PROCEDURE — 7100000011 HC PHASE II RECOVERY - ADDTL 15 MIN: Performed by: ORTHOPAEDIC SURGERY

## 2025-02-03 PROCEDURE — 2709999900 HC NON-CHARGEABLE SUPPLY: Performed by: ORTHOPAEDIC SURGERY

## 2025-02-03 PROCEDURE — 2500000003 HC RX 250 WO HCPCS: Performed by: NURSE ANESTHETIST, CERTIFIED REGISTERED

## 2025-02-03 PROCEDURE — 2500000003 HC RX 250 WO HCPCS: Performed by: ORTHOPAEDIC SURGERY

## 2025-02-03 PROCEDURE — 64445 NJX AA&/STRD SCIATIC NRV IMG: CPT | Performed by: ANESTHESIOLOGY

## 2025-02-03 PROCEDURE — 3700000000 HC ANESTHESIA ATTENDED CARE: Performed by: ORTHOPAEDIC SURGERY

## 2025-02-03 PROCEDURE — 2500000003 HC RX 250 WO HCPCS: Performed by: PHYSICIAN ASSISTANT

## 2025-02-03 PROCEDURE — 7100000001 HC PACU RECOVERY - ADDTL 15 MIN: Performed by: ORTHOPAEDIC SURGERY

## 2025-02-03 PROCEDURE — 6360000002 HC RX W HCPCS: Performed by: PHYSICIAN ASSISTANT

## 2025-02-03 PROCEDURE — 73600 X-RAY EXAM OF ANKLE: CPT

## 2025-02-03 PROCEDURE — 6360000002 HC RX W HCPCS: Performed by: NURSE ANESTHETIST, CERTIFIED REGISTERED

## 2025-02-03 PROCEDURE — C1713 ANCHOR/SCREW BN/BN,TIS/BN: HCPCS | Performed by: ORTHOPAEDIC SURGERY

## 2025-02-03 PROCEDURE — 3700000001 HC ADD 15 MINUTES (ANESTHESIA): Performed by: ORTHOPAEDIC SURGERY

## 2025-02-03 PROCEDURE — 6370000000 HC RX 637 (ALT 250 FOR IP): Performed by: ANESTHESIOLOGY

## 2025-02-03 PROCEDURE — 2580000003 HC RX 258: Performed by: PHYSICIAN ASSISTANT

## 2025-02-03 PROCEDURE — 6360000002 HC RX W HCPCS: Performed by: ANESTHESIOLOGY

## 2025-02-03 PROCEDURE — 7100000010 HC PHASE II RECOVERY - FIRST 15 MIN: Performed by: ORTHOPAEDIC SURGERY

## 2025-02-03 PROCEDURE — 3600000014 HC SURGERY LEVEL 4 ADDTL 15MIN: Performed by: ORTHOPAEDIC SURGERY

## 2025-02-03 DEVICE — IMPLANTABLE DEVICE
Type: IMPLANTABLE DEVICE | Site: ANKLE | Status: FUNCTIONAL
Brand: ORTHOLOC

## 2025-02-03 DEVICE — IMPLANTABLE DEVICE
Type: IMPLANTABLE DEVICE | Site: ANKLE | Status: FUNCTIONAL
Brand: ORTHOLOC 3DI

## 2025-02-03 DEVICE — CANCELLOUS SCREW
Type: IMPLANTABLE DEVICE | Site: ANKLE | Status: FUNCTIONAL
Brand: ORTHOLOC

## 2025-02-03 DEVICE — IMPLANTABLE DEVICE
Type: IMPLANTABLE DEVICE | Site: ANKLE | Status: FUNCTIONAL
Brand: ORTHOLOC 3DI PLATING SYSTEM

## 2025-02-03 RX ORDER — ONDANSETRON 2 MG/ML
INJECTION INTRAMUSCULAR; INTRAVENOUS
Status: DISCONTINUED | OUTPATIENT
Start: 2025-02-03 | End: 2025-02-03 | Stop reason: SDUPTHER

## 2025-02-03 RX ORDER — MAGNESIUM HYDROXIDE 1200 MG/15ML
LIQUID ORAL CONTINUOUS PRN
Status: COMPLETED | OUTPATIENT
Start: 2025-02-03 | End: 2025-02-03

## 2025-02-03 RX ORDER — CYCLOBENZAPRINE HCL 10 MG
10 TABLET ORAL 3 TIMES DAILY PRN
Qty: 30 TABLET | Refills: 0 | Status: SHIPPED | OUTPATIENT
Start: 2025-02-03 | End: 2025-02-13

## 2025-02-03 RX ORDER — TRANEXAMIC ACID 10 MG/ML
1000 INJECTION, SOLUTION INTRAVENOUS ONCE
Status: DISCONTINUED | OUTPATIENT
Start: 2025-02-03 | End: 2025-02-03 | Stop reason: HOSPADM

## 2025-02-03 RX ORDER — LABETALOL HYDROCHLORIDE 5 MG/ML
10 INJECTION, SOLUTION INTRAVENOUS
Status: DISCONTINUED | OUTPATIENT
Start: 2025-02-03 | End: 2025-02-03 | Stop reason: HOSPADM

## 2025-02-03 RX ORDER — ONDANSETRON 4 MG/1
4 TABLET, FILM COATED ORAL 3 TIMES DAILY PRN
Qty: 15 TABLET | Refills: 0 | Status: SHIPPED | OUTPATIENT
Start: 2025-02-03

## 2025-02-03 RX ORDER — ONDANSETRON 2 MG/ML
4 INJECTION INTRAMUSCULAR; INTRAVENOUS
Status: DISCONTINUED | OUTPATIENT
Start: 2025-02-03 | End: 2025-02-03 | Stop reason: HOSPADM

## 2025-02-03 RX ORDER — LIDOCAINE HYDROCHLORIDE 10 MG/ML
0.3 INJECTION, SOLUTION EPIDURAL; INFILTRATION; INTRACAUDAL; PERINEURAL
Status: DISCONTINUED | OUTPATIENT
Start: 2025-02-03 | End: 2025-02-03 | Stop reason: HOSPADM

## 2025-02-03 RX ORDER — SODIUM CHLORIDE 0.9 % (FLUSH) 0.9 %
5-40 SYRINGE (ML) INJECTION EVERY 12 HOURS SCHEDULED
Status: DISCONTINUED | OUTPATIENT
Start: 2025-02-03 | End: 2025-02-03 | Stop reason: HOSPADM

## 2025-02-03 RX ORDER — MELOXICAM 15 MG/1
15 TABLET ORAL DAILY
Qty: 90 TABLET | Refills: 0 | Status: SHIPPED | OUTPATIENT
Start: 2025-02-03

## 2025-02-03 RX ORDER — CEPHALEXIN 500 MG/1
500 CAPSULE ORAL 4 TIMES DAILY
Qty: 40 CAPSULE | Refills: 0 | Status: SHIPPED | OUTPATIENT
Start: 2025-02-03

## 2025-02-03 RX ORDER — MIDAZOLAM HYDROCHLORIDE 1 MG/ML
2 INJECTION, SOLUTION INTRAMUSCULAR; INTRAVENOUS
Status: DISCONTINUED | OUTPATIENT
Start: 2025-02-03 | End: 2025-02-03 | Stop reason: HOSPADM

## 2025-02-03 RX ORDER — OXYCODONE HYDROCHLORIDE 5 MG/1
5 TABLET ORAL EVERY 6 HOURS PRN
Qty: 28 TABLET | Refills: 0 | Status: SHIPPED | OUTPATIENT
Start: 2025-02-03 | End: 2025-02-10

## 2025-02-03 RX ORDER — BUPIVACAINE HYDROCHLORIDE 5 MG/ML
INJECTION, SOLUTION EPIDURAL; INTRACAUDAL
Status: COMPLETED | OUTPATIENT
Start: 2025-02-03 | End: 2025-02-03

## 2025-02-03 RX ORDER — SODIUM CHLORIDE, SODIUM LACTATE, POTASSIUM CHLORIDE, CALCIUM CHLORIDE 600; 310; 30; 20 MG/100ML; MG/100ML; MG/100ML; MG/100ML
INJECTION, SOLUTION INTRAVENOUS CONTINUOUS
Status: DISCONTINUED | OUTPATIENT
Start: 2025-02-03 | End: 2025-02-03 | Stop reason: HOSPADM

## 2025-02-03 RX ORDER — MIDAZOLAM HYDROCHLORIDE 1 MG/ML
INJECTION, SOLUTION INTRAMUSCULAR; INTRAVENOUS
Status: COMPLETED | OUTPATIENT
Start: 2025-02-03 | End: 2025-02-03

## 2025-02-03 RX ORDER — SODIUM CHLORIDE 9 MG/ML
INJECTION, SOLUTION INTRAVENOUS PRN
Status: DISCONTINUED | OUTPATIENT
Start: 2025-02-03 | End: 2025-02-03 | Stop reason: HOSPADM

## 2025-02-03 RX ORDER — SODIUM CHLORIDE 0.9 % (FLUSH) 0.9 %
5-40 SYRINGE (ML) INJECTION PRN
Status: DISCONTINUED | OUTPATIENT
Start: 2025-02-03 | End: 2025-02-03 | Stop reason: HOSPADM

## 2025-02-03 RX ORDER — TRANEXAMIC ACID 10 MG/ML
1000 INJECTION, SOLUTION INTRAVENOUS
Status: COMPLETED | OUTPATIENT
Start: 2025-02-03 | End: 2025-02-03

## 2025-02-03 RX ORDER — NALOXONE HYDROCHLORIDE 0.4 MG/ML
INJECTION, SOLUTION INTRAMUSCULAR; INTRAVENOUS; SUBCUTANEOUS PRN
Status: DISCONTINUED | OUTPATIENT
Start: 2025-02-03 | End: 2025-02-03 | Stop reason: HOSPADM

## 2025-02-03 RX ORDER — ASPIRIN 81 MG/1
81 TABLET, CHEWABLE ORAL 2 TIMES DAILY
Qty: 60 TABLET | Refills: 0 | Status: SHIPPED | OUTPATIENT
Start: 2025-02-04

## 2025-02-03 RX ORDER — DEXAMETHASONE SODIUM PHOSPHATE 4 MG/ML
INJECTION, SOLUTION INTRA-ARTICULAR; INTRALESIONAL; INTRAMUSCULAR; INTRAVENOUS; SOFT TISSUE
Status: DISCONTINUED | OUTPATIENT
Start: 2025-02-03 | End: 2025-02-03 | Stop reason: SDUPTHER

## 2025-02-03 RX ORDER — FENTANYL CITRATE 50 UG/ML
50 INJECTION, SOLUTION INTRAMUSCULAR; INTRAVENOUS EVERY 5 MIN PRN
Status: DISCONTINUED | OUTPATIENT
Start: 2025-02-03 | End: 2025-02-03 | Stop reason: HOSPADM

## 2025-02-03 RX ORDER — PHENYLEPHRINE HCL IN 0.9% NACL 1 MG/10 ML
SYRINGE (ML) INTRAVENOUS
Status: DISCONTINUED | OUTPATIENT
Start: 2025-02-03 | End: 2025-02-03 | Stop reason: SDUPTHER

## 2025-02-03 RX ORDER — METOCLOPRAMIDE HYDROCHLORIDE 5 MG/ML
10 INJECTION INTRAMUSCULAR; INTRAVENOUS
Status: DISCONTINUED | OUTPATIENT
Start: 2025-02-03 | End: 2025-02-03 | Stop reason: HOSPADM

## 2025-02-03 RX ORDER — FENTANYL CITRATE 50 UG/ML
INJECTION, SOLUTION INTRAMUSCULAR; INTRAVENOUS
Status: COMPLETED | OUTPATIENT
Start: 2025-02-03 | End: 2025-02-03

## 2025-02-03 RX ORDER — PROPOFOL 10 MG/ML
INJECTION, EMULSION INTRAVENOUS
Status: DISCONTINUED | OUTPATIENT
Start: 2025-02-03 | End: 2025-02-03 | Stop reason: SDUPTHER

## 2025-02-03 RX ORDER — FENTANYL CITRATE 50 UG/ML
INJECTION, SOLUTION INTRAMUSCULAR; INTRAVENOUS
Status: DISCONTINUED | OUTPATIENT
Start: 2025-02-03 | End: 2025-02-03 | Stop reason: SDUPTHER

## 2025-02-03 RX ORDER — LIDOCAINE HYDROCHLORIDE 20 MG/ML
INJECTION, SOLUTION EPIDURAL; INFILTRATION; INTRACAUDAL; PERINEURAL
Status: DISCONTINUED | OUTPATIENT
Start: 2025-02-03 | End: 2025-02-03 | Stop reason: SDUPTHER

## 2025-02-03 RX ORDER — ROCURONIUM BROMIDE 10 MG/ML
INJECTION, SOLUTION INTRAVENOUS
Status: DISCONTINUED | OUTPATIENT
Start: 2025-02-03 | End: 2025-02-03 | Stop reason: SDUPTHER

## 2025-02-03 RX ADMIN — ROCURONIUM BROMIDE 50 MG: 50 INJECTION, SOLUTION INTRAVENOUS at 11:33

## 2025-02-03 RX ADMIN — DEXAMETHASONE SODIUM PHOSPHATE 4 MG: 4 INJECTION, SOLUTION INTRAMUSCULAR; INTRAVENOUS at 11:39

## 2025-02-03 RX ADMIN — PROPOFOL 150 MG: 10 INJECTION, EMULSION INTRAVENOUS at 11:33

## 2025-02-03 RX ADMIN — LIDOCAINE HYDROCHLORIDE 50 MG: 20 INJECTION, SOLUTION EPIDURAL; INFILTRATION; INTRACAUDAL; PERINEURAL at 11:33

## 2025-02-03 RX ADMIN — ONDANSETRON 4 MG: 2 INJECTION INTRAMUSCULAR; INTRAVENOUS at 11:39

## 2025-02-03 RX ADMIN — TRANEXAMIC ACID 1000 MG: 10 INJECTION, SOLUTION INTRAVENOUS at 11:39

## 2025-02-03 RX ADMIN — SODIUM CHLORIDE: 9 INJECTION, SOLUTION INTRAVENOUS at 11:27

## 2025-02-03 RX ADMIN — SUGAMMADEX 200 MG: 100 INJECTION, SOLUTION INTRAVENOUS at 12:49

## 2025-02-03 RX ADMIN — MIDAZOLAM 2 MG: 1 INJECTION INTRAMUSCULAR; INTRAVENOUS at 11:11

## 2025-02-03 RX ADMIN — CEFAZOLIN 2000 MG: 2 INJECTION, POWDER, FOR SOLUTION INTRAVENOUS at 11:39

## 2025-02-03 RX ADMIN — FENTANYL CITRATE 100 MCG: 50 INJECTION, SOLUTION INTRAMUSCULAR; INTRAVENOUS at 11:11

## 2025-02-03 RX ADMIN — FENTANYL CITRATE 50 MCG: 50 INJECTION, SOLUTION INTRAMUSCULAR; INTRAVENOUS at 11:33

## 2025-02-03 RX ADMIN — BUPIVACAINE HYDROCHLORIDE 30 ML: 5 INJECTION, SOLUTION EPIDURAL; INTRACAUDAL; PERINEURAL at 11:11

## 2025-02-03 RX ADMIN — FENTANYL CITRATE 50 MCG: 50 INJECTION, SOLUTION INTRAMUSCULAR; INTRAVENOUS at 12:48

## 2025-02-03 RX ADMIN — Medication 100 MCG: at 11:54

## 2025-02-03 RX ADMIN — Medication 100 MCG: at 11:41

## 2025-02-03 RX ADMIN — Medication 2 AMPULE: at 10:55

## 2025-02-03 ASSESSMENT — PAIN - FUNCTIONAL ASSESSMENT
PAIN_FUNCTIONAL_ASSESSMENT: 0-10
PAIN_FUNCTIONAL_ASSESSMENT: 0-10

## 2025-02-03 ASSESSMENT — LIFESTYLE VARIABLES: SMOKING_STATUS: 0

## 2025-02-03 NOTE — PROGRESS NOTES
Time out performed with Dr. Adams for right leg nerve block. Patient placed on telemetry, continuous pulse oximetry, and 2L NC for the procedure. BP cycled every five minutes. Cardiac rhythm is NSR. Patient tolerated well, VSS stable throughout. Will continue to monitor VS every 15 minutes post procedure. Side rails in place, call light within reach, once alert patient instructed to press call button if assistance is need, verbalized understanding.

## 2025-02-03 NOTE — ANESTHESIA PROCEDURE NOTES
Peripheral Block    Patient location during procedure: pre-op  Reason for block: post-op pain management and at surgeon's request  Start time: 2/3/2025 11:11 AM  End time: 2/3/2025 11:21 AM  Staffing  Performed: anesthesiologist   Anesthesiologist: Narinder Adams MD  Performed by: Narinder Adams MD  Authorized by: Narinder Adams MD    Preanesthetic Checklist  Completed: patient identified, IV checked, site marked, risks and benefits discussed, surgical/procedural consents, equipment checked, pre-op evaluation, timeout performed, anesthesia consent given, oxygen available and monitors applied/VS acknowledged  Peripheral Block   Patient position: supine  Prep: ChloraPrep  Provider prep: mask and sterile gloves  Patient monitoring: cardiac monitor, continuous pulse ox, frequent blood pressure checks and IV access  Block type: Sciatic  Popliteal  Laterality: right  Injection technique: single-shot  Guidance: ultrasound guided  Local infiltration: lidocaine  Infiltration strength: 1 %  Local infiltration: lidocaine  Dose: 3 mL    Needle   Needle gauge: 21 G  Needle localization: ultrasound guidance  Needle length: 10 cm  Assessment   Injection assessment: negative aspiration for heme, no paresthesia on injection and local visualized surrounding nerve on ultrasound  Paresthesia pain: none  Slow fractionated injection: yes  Hemodynamics: stable  Outcomes: uncomplicated and patient tolerated procedure well    Additional Notes  Immediately prior to procedure a \"time out\" was called to verify the correct patient, allergies, laterality, procedure and equipment. Time out performed with  RN    Local Anesthetic: 0.5 %  Bupivacaine   Amount: 30 ml  in 5 ml increments after negative aspiration each time.    Biceps Femoris muscle (long head), Vastus lateralis muscle, Sciatic nerve (Tibia and Common Peroneal Nerves) and Popliteal artery are identified; the tip of the needle and the spread of the local anesthetic around the Tibial and  Deteriorating patient status - Patient was clinically upgraded due to deteriorating patient status.

## 2025-02-03 NOTE — ANESTHESIA PRE PROCEDURE
Department of Anesthesiology  Preprocedure Note       Name:  Ayo Hurley   Age:  64 y.o.  :  1960                                          MRN:  [id number]         Date:  2/3/2025      Surgeon: Surgeon(s):  Olvin Salter MD    Procedure: Procedure(s):  OPEN REDUCTION INTERNAL FIXATION RIGHT ANKLE, TRIMALLEOLAR WITH  POSTERIOR LIP                        DANNY    Medications prior to admission:   Prior to Admission medications    Medication Sig Start Date End Date Taking? Authorizing Provider   Collagen Hydrolysate POWD by Does not apply route   Yes ProviderHeriberto MD   ibuprofen (IBU) 600 MG tablet Take 1 tablet by mouth every 6 hours as needed for Pain 25  Yes Reji Forte II,    Tirzepatide 5 MG/0.5ML SOAJ Inject 5 mg into the skin every 7 days 25  Yes Obdulia Alan APRN - CNP   fluticasone (VERAMYST) 27.5 MCG/SPRAY nasal spray 2 sprays by Each Nostril route daily   Yes Heriberto Ramirez MD   acetaminophen (TYLENOL) 325 MG tablet Take 2 tablets by mouth every 6 hours as needed for Pain Indications: OTC   Yes Provider, MD Heriberto   Continuous Glucose Sensor (FREESTYLE RAVINDRA 2 SENSOR) MISC APPLY/REMOVE ONE DEVICE EVERY 14 DAYS 24   Bela Still APRN - CNP   blood glucose test strips (FREESTYLE LITE) strip 1 each by In Vitro route daily As needed. 24   Bela Still APRN - CNP   insulin lispro, 1 Unit Dial, (HUMALOG/ADMELOG) 100 UNIT/ML SOPN FOR BLOOD SUGAR 150-199: INJECT 1 UNIT UNDER THE SKIN, 200-249: 2 UNITS, 250-299: 3 UNITS, 300-349: 4 UNITS, 350-399: 5 UNITS. TID with meals. MAX DAILY UNITS: 20.  Patient not taking: Reported on 2025 8/15/24   Obdulia Alan APRN - CNP   Insulin Pen Needle 32G X 4 MM MISC 1 each by Does not apply route daily 23   Bela Still APRN - CNP   Continuous Blood Gluc  (FREESTYLE RAVINDRA 2 READER) JODY 1 Device by Does not apply route daily 22   Regina Salas CAROLINA - CNP   glucose

## 2025-02-03 NOTE — H&P
Update History & Physical     The patient's History and Physical of 1/30/2025 was reviewed with the patient, and I examined the patient. There were no changes - see below for exam of affected area.  Today's exam of affected area, in reference to the planned operation today, is unchanged from surgeon's office note on 1/28/2025 (see note)    Both the patient and I confirmed the surgical site.     Plan: The risks, benefits, expected outcome, and alternative to the recommended procedure have been discussed with the patient / family. Patient understands and wants to proceed with the procedure.      Electronically signed by Olvin Salter MD on 2/3/2025 at 11:06 AM

## 2025-02-03 NOTE — OP NOTE
Orthopaedic Surgery  Operative Report      Patient Name:  Ayo Hurley  Patient :  1960  MRN: [id number]    Date: 25     Pre-operative Diagnosis:   S82.851A Right Ankle fracture -trimalleolar    Post-operative Diagnosis:    Same    Procedure: RIGHT   Open reduction internal fixation Ankle fracture - Tri-malleolar with posterior lip repair    Surgeon:  Surgeons and Role:     * Olvin Salter MD - Primary    Assistant: Circulator: Asya Ni RN  Surgical Assistant: Shannon Girard Jennifer  Radiology Technologist: Radha Cueto  Relief Circulator: Ana Blanchard RN  Relief Scrub: Ester Ceron  Scrub Person First: Maxine Wallace RN    Anesthesia: General endotracheal anesthesia and Regional with popliteal nerve block    Estimated blood loss: 25    Specimens: * No specimens in log *    Complications: None    Drains: None    Condition: Stable    Implants:   Implant Name Type Inv. Item Serial No.  Lot No. LRB No. Used Action   PLATE BNE SM POST TIB ORTHOLOC 3DI - [id number]  PLATE BNE SM POST TIB ORTHOLOC 3DI  [facility]  Right 1 Implanted   PLATE BNE L101MM R LAT FIBULAR ORTHOLOC 3DI - [id number]  PLATE BNE L101MM R LAT FIBULAR ORTHOLOC 3DI  [facility]  Right 1 Implanted   SCREW BNE L20MM DIA2.7MM MAREK TI FOREFOOT MIDFOOT ANK - [id number]  SCREW BNE L20MM DIA2.7MM MAREK TI FOREFOOT MIDFOOT ANK  [facility]  Right 1 Implanted   SCREW BNE L16MM DIA4MM GRN TI CANC ANK FULL THRD BLNT - [id number]  SCREW BNE L16MM DIA4MM GRN TI CANC ANK FULL THRD BLNT  [facility]  Right 1 Implanted   SCREW BNE L12MM DIA3.5MM MAREK TI POLYAX NONLOCKING FULL - [id number]  SCREW BNE L12MM DIA3.5MM MAREK TI POLYAX NONLOCKING FULL  [facility]  Right 2 Implanted   SCREW BNE L14MM DIA3.5MM MAREK FT ANK TI NONLOCKING FULL - [id number]  SCREW BNE L14MM DIA3.5MM MAREK FT ANK TI

## 2025-02-03 NOTE — ANESTHESIA POSTPROCEDURE EVALUATION
Department of Anesthesiology  Postprocedure Note    Patient: Ayo Hurley  MRN: [id number]  YOB: 1960  Date of evaluation: 2/3/2025    Procedure Summary       Date: 02/03/25 Room / Location: [address]    Anesthesia Start: 1127 Anesthesia Stop: 1307    Procedure: OPEN REDUCTION INTERNAL FIXATION RIGHT ANKLE, TRIMALLEOLAR WITH  POSTERIOR LIP (Right: Ankle) Diagnosis:       Closed trimalleolar fracture of right ankle      (Closed trimalleolar fracture of right ankle [S82.851A])    Surgeons: Olvin Salter MD Responsible Provider: Narinder Adams MD    Anesthesia Type: general, regional ASA Status: 3            Anesthesia Type: No value filed.    Dae Phase I: Dae Score: 10    Dae Phase II:      Anesthesia Post Evaluation    Patient location during evaluation: PACU  Level of consciousness: awake and alert  Pain score: 0  Airway patency: patent  Nausea & Vomiting: no vomiting  Cardiovascular status: blood pressure returned to baseline  Respiratory status: acceptable  Hydration status: euvolemic  Multimodal analgesia pain management approach  Pain management: adequate    No notable events documented.

## 2025-02-03 NOTE — PROGRESS NOTES
Patient admitted to [facility] 9 in preparation for surgery, VSS. Consent confirmed. IV inserted into left hand, NS infusing. Belongings on cart to PACU. NPO since midnight. Family at bedside, phone number in system for text updates, call light within reach.

## 2025-02-03 NOTE — DISCHARGE INSTRUCTIONS
discarding them can be found at:  [email or web address]    Hospital or office staff may NOT accept any medications to drop off in the cabinet for you.   .

## 2025-02-10 ENCOUNTER — TELEPHONE (OUTPATIENT)
Dept: ORTHOPEDIC SURGERY | Age: 65
End: 2025-02-10

## 2025-02-20 ENCOUNTER — OFFICE VISIT (OUTPATIENT)
Dept: ORTHOPEDIC SURGERY | Age: 65
End: 2025-02-20

## 2025-02-20 VITALS — HEIGHT: 73 IN | BODY MASS INDEX: 29.42 KG/M2 | WEIGHT: 222 LBS

## 2025-02-20 DIAGNOSIS — Z98.890 S/P ORIF (OPEN REDUCTION INTERNAL FIXATION) FRACTURE: Primary | ICD-10-CM

## 2025-02-20 DIAGNOSIS — Z87.81 S/P ORIF (OPEN REDUCTION INTERNAL FIXATION) FRACTURE: Primary | ICD-10-CM

## 2025-02-20 NOTE — PROGRESS NOTES
Dr Olvin Salter      Date /Time 2/20/2025       10:49 AM EST  Name Ayo Hurley             1960   [facility]  MRN [id number]                Chief Complaint   Patient presents with    Post-Op Check     RIGHT ANKLE ORIF 2/3/25        History of Present Illness      Ayo Hurley is a 64 y.o. male is here for post-op visit after RIGHT  ORIF right ankle trimalleolar ankle fracture with posterior lip repair    Patient presents to the office today for follow-up visit.  Patient's status post ankle surgery.  Patient doing well.  Patient denies fever, chills, or drainage.  Pain controlled.    Physical Exam    Based off 1997 Exam Criteria    There were no vitals taken for this visit.     Constitutional:       General: He is not in acute distress.     Appearance: Normal appearance.     RIGHT ankle: incision clean, intact, healing appropriately. No surrounding  erythema or fluctuance. Neuro intact distal. No evidence of DVT.        Imaging           Assessment and Plan    Diagnoses and all orders for this visit:    S/P ORIF (open reduction internal fixation) fracture        Patient is doing well.  He is placed into a tall fracture boot.  Continue nonweightbearing.  Follow-up in 1 week for x-rays wound inspection and likely suture removal.  Patient will be nonweightbearing for 4 weeks.        Procedures    Breg / Airselect Tall Walking Boot     Patient was prescribed a KeepRecipesg Tall Serina Walking Boot.  The right ankle will require stabilization / immobilization from this semi-rigid / rigid orthosis to improve their function.  The orthosis will assist in protecting the affected area, provide functional support and facilitate healing.    Patient was instructed to progress ambulation  as non weight bearing in the device.  The plan of care is to progress the patient to full weight bearing status.     The patient was educated and fit by a healthcare professional with expert knowledge and specialization

## 2025-02-27 ENCOUNTER — OFFICE VISIT (OUTPATIENT)
Dept: ORTHOPEDIC SURGERY | Age: 65
End: 2025-02-27

## 2025-02-27 VITALS — BODY MASS INDEX: 29.42 KG/M2 | WEIGHT: 222 LBS | HEIGHT: 73 IN

## 2025-02-27 DIAGNOSIS — Z87.81 S/P ORIF (OPEN REDUCTION INTERNAL FIXATION) FRACTURE: ICD-10-CM

## 2025-02-27 DIAGNOSIS — R52 PAIN: Primary | ICD-10-CM

## 2025-02-27 DIAGNOSIS — Z98.890 S/P ORIF (OPEN REDUCTION INTERNAL FIXATION) FRACTURE: ICD-10-CM

## 2025-02-27 PROCEDURE — 99024 POSTOP FOLLOW-UP VISIT: CPT | Performed by: ORTHOPAEDIC SURGERY

## 2025-02-27 NOTE — PROGRESS NOTES
Dr Olvin Salter      Date /Time 2/27/2025       10:49 AM EST  Name Ayo Hurley             1960   Location  [facility]TH[facility]  MRN [id number]                Chief Complaint   Patient presents with    Follow-up     Ck Right Ankle  - ORIF 02/03/2025        History of Present Illness      Ayo Hurley is a 64 y.o. male is here for post-op visit after RIGHT  ORIF right ankle trimalleolar ankle fracture with posterior lip repair    Patient presents to the office today for follow-up visit.  Patient's status post ankle surgery.  Patient doing well.  Patient denies fever, chills, or drainage.  Pain controlled.    Physical Exam    Based off 1997 Exam Criteria    Ht 1.854 m (6' 1\")   Wt 100.7 kg (222 lb)   BMI 29.29 kg/m²      Constitutional:       General: He is not in acute distress.     Appearance: Normal appearance.     RIGHT ankle: incision clean, intact, healing appropriately. No surrounding  erythema or fluctuance. Neuro intact distal. No evidence of DVT.        Imaging       X-rays were ordered today and reviewed of the right ankle.  AP, lateral, and mortise views.  They demonstrate fractures and hardware in good position.  Intact medial clear space    Assessment and Plan    Ayo was seen today for follow-up.    Diagnoses and all orders for this visit:    Pain  -     XR ANKLE RIGHT (MIN 3 VIEWS); Future    S/P ORIF (open reduction internal fixation) fracture        Patient is doing well.  He is to continue in his tall fracture boot.  Continue nonweightbearing.  He will continue nonweightbearing until he is 4 weeks.  At 4 weeks she will gradually wean to weightbearing as tolerated.  We will see him back in 4 weeks or sooner if problems arise.  Sutures removed and Steri-Strips applied today.    No orders of the defined types were placed in this encounter.     Electronically signed by Olvin Salter MD on 2/27/2025 at 3:44 PM  This dictation was generated by voice recognition computer software.

## 2025-03-01 DIAGNOSIS — Z79.4 TYPE 2 DIABETES MELLITUS WITH HYPERGLYCEMIA, WITH LONG-TERM CURRENT USE OF INSULIN (HCC): ICD-10-CM

## 2025-03-01 DIAGNOSIS — E11.65 TYPE 2 DIABETES MELLITUS WITH HYPERGLYCEMIA, WITH LONG-TERM CURRENT USE OF INSULIN (HCC): ICD-10-CM

## 2025-03-03 DIAGNOSIS — E11.65 TYPE 2 DIABETES MELLITUS WITH HYPERGLYCEMIA, WITH LONG-TERM CURRENT USE OF INSULIN (HCC): ICD-10-CM

## 2025-03-03 DIAGNOSIS — Z79.4 TYPE 2 DIABETES MELLITUS WITH HYPERGLYCEMIA, WITH LONG-TERM CURRENT USE OF INSULIN (HCC): ICD-10-CM

## 2025-03-03 RX ORDER — ASPIRIN 81 MG
TABLET,CHEWABLE ORAL
Qty: 60 TABLET | Refills: 0 | OUTPATIENT
Start: 2025-03-03

## 2025-03-03 RX ORDER — TIRZEPATIDE 5 MG/.5ML
INJECTION, SOLUTION SUBCUTANEOUS
Qty: 2 ML | Refills: 0 | Status: SHIPPED | OUTPATIENT
Start: 2025-03-03

## 2025-03-03 NOTE — TELEPHONE ENCOUNTER
Last Office Visit  -  1/30/25  Next Office Visit  -  n/a    Last Filled  -  1/20/25  Last UDS -    Contract -

## 2025-03-05 ENCOUNTER — TELEPHONE (OUTPATIENT)
Dept: ADMINISTRATIVE | Age: 65
End: 2025-03-05

## 2025-03-05 NOTE — TELEPHONE ENCOUNTER
Submitted PA for Mounjaro 5MG/0.5ML auto-injectors   Via CM Key: [id number]  STATUS: PENDING.    Follow up done daily; if no decision with in three days we will refax.  If another three days goes by with no decision will call the insurance for status.

## 2025-03-27 ENCOUNTER — OFFICE VISIT (OUTPATIENT)
Dept: ORTHOPEDIC SURGERY | Age: 65
End: 2025-03-27

## 2025-03-27 VITALS — WEIGHT: 222 LBS | HEIGHT: 73 IN | BODY MASS INDEX: 29.42 KG/M2

## 2025-03-27 DIAGNOSIS — Z87.81 S/P ORIF (OPEN REDUCTION INTERNAL FIXATION) FRACTURE: Primary | ICD-10-CM

## 2025-03-27 DIAGNOSIS — Z98.890 S/P ORIF (OPEN REDUCTION INTERNAL FIXATION) FRACTURE: Primary | ICD-10-CM

## 2025-03-27 PROCEDURE — 99024 POSTOP FOLLOW-UP VISIT: CPT | Performed by: PHYSICIAN ASSISTANT

## 2025-03-27 NOTE — PROGRESS NOTES
Dr Olvin Salter      Date /Time 3/27/2025       10:49 AM EST  Name Ayo Hurley             1960   [facility]  MRN [id number]                Chief Complaint   Patient presents with    Follow-up     Ck Right Ankle ORIF 02/03/2025        History of Present Illness      Ayo Hurley is a 64 y.o. male is here for post-op visit after RIGHT  ORIF right ankle trimalleolar ankle fracture with posterior lip repair    Patient presents to the office today for follow-up visit.  Patient's status post ankle surgery.  Patient doing well.  Patient denies fever, chills, or drainage.  Pain controlled.  Patient is now 7.5 weeks from surgery.  Still in tall fracture boot and weightbearing as tolerated.    Physical Exam    Based off 1997 Exam Criteria    Ht 1.854 m (6' 1\")   Wt 100.7 kg (222 lb)   BMI 29.29 kg/m²      Constitutional:       General: He is not in acute distress.     Appearance: Normal appearance.     RIGHT ankle: incision clean, intact, healing appropriately. No surrounding  erythema or fluctuance. Neuro intact distal. No evidence of DVT.        Imaging       X-rays were ordered today and reviewed of the right ankle.  AP, lateral, and mortise views.  They demonstrate fractures and hardware in good position.  Intact medial clear space    Assessment and Plan    Ayo was seen today for follow-up.    Diagnoses and all orders for this visit:    S/P ORIF (open reduction internal fixation) fracture  -     XR ANKLE RIGHT (MIN 3 VIEWS); Future        Patient is doing well.  Wean out of boot into normal supportive shoes.  Weightbearing as tolerated start physical therapy in 1 week.  We will see the patient back in 6 weeks for final x-rays or sooner if problems arise peer    No orders of the defined types were placed in this encounter.     Electronically signed by Bryan Dennis PA-C on 3/27/2025 at 3:42 PM  This dictation was generated by voice recognition computer software.  Although all attempts

## 2025-03-31 DIAGNOSIS — Z79.4 TYPE 2 DIABETES MELLITUS WITH HYPERGLYCEMIA, WITH LONG-TERM CURRENT USE OF INSULIN (HCC): ICD-10-CM

## 2025-03-31 DIAGNOSIS — E11.65 TYPE 2 DIABETES MELLITUS WITH HYPERGLYCEMIA, WITH LONG-TERM CURRENT USE OF INSULIN (HCC): ICD-10-CM

## 2025-03-31 DIAGNOSIS — Z79.4 LONG TERM (CURRENT) USE OF INSULIN (HCC): ICD-10-CM

## 2025-03-31 RX ORDER — TIRZEPATIDE 5 MG/.5ML
INJECTION, SOLUTION SUBCUTANEOUS
Qty: 2 ML | Refills: 5 | Status: SHIPPED | OUTPATIENT
Start: 2025-03-31

## 2025-03-31 NOTE — TELEPHONE ENCOUNTER
Last Office Visit  -  1/30/25  Next Office Visit  -  n/a    Last Filled  -    Last UDS -   Contract -

## 2025-03-31 NOTE — TELEPHONE ENCOUNTER
Last Office Visit  -  1/30/25  Next Office Visit  -  n/a    Last Filled  -  3/3/25  Last UDS -    Contract -

## 2025-04-02 ENCOUNTER — APPOINTMENT (OUTPATIENT)
Dept: PHYSICAL THERAPY | Age: 65
End: 2025-04-02
Payer: COMMERCIAL

## 2025-04-03 ENCOUNTER — HOSPITAL ENCOUNTER (OUTPATIENT)
Dept: PHYSICAL THERAPY | Age: 65
Setting detail: THERAPIES SERIES
Discharge: HOME | End: 2025-04-03
Payer: COMMERCIAL

## 2025-04-03 DIAGNOSIS — M25.671 ANKLE STIFFNESS, RIGHT: ICD-10-CM

## 2025-04-03 DIAGNOSIS — M25.571 RIGHT ANKLE PAIN, UNSPECIFIED CHRONICITY: Primary | ICD-10-CM

## 2025-04-03 PROCEDURE — 97161 PT EVAL LOW COMPLEX 20 MIN: CPT | Performed by: PHYSICAL THERAPIST

## 2025-04-03 PROCEDURE — 97110 THERAPEUTIC EXERCISES: CPT | Performed by: PHYSICAL THERAPIST

## 2025-04-03 NOTE — PLAN OF CARE
[facility] - Outpatient [facility] and Therapy: [address], [place] [address] office: [phone] fax: [phone]     Physical Therapy Initial Evaluation Certification      Dear Bryan Dennis PA-C ,    We had the pleasure of evaluating the following patient for physical therapy services at [facility].  A summary of our findings can be found in the initial assessment below.  This includes our plan of care.  If you have any questions or concerns regarding these findings, please do not hesitate to contact me at the office phone number listed above.  Thank you for the referral.     Physician Signature:_______________________________Date:__________________  By signing above (or electronic signature), therapist’s plan is approved by physician       Physical Therapy: TREATMENT/PROGRESS NOTE   Patient: Ayo Hurley (64 y.o. male)   Examination Date: 2025   :  1960 MRN: [id number]   Visit #: 1   Insurance Allowable Auth Needed   75 []Yes    [x]No    Insurance: Payor: [place] BCBS / Plan[place] P[place] / Product Type: *No Product type* /   Insurance ID: [id number] - ([facility])  Secondary Insurance (if applicable):    Treatment Diagnosis:     ICD-10-CM    1. Right ankle pain, unspecified chronicity  M25.571       2. Ankle stiffness, right  M25.671          Medical Diagnosis:  S/P ORIF (open reduction internal fixation) fracture [Z98.890, Z87.81]   Referring Physician: Bryan Dennis PA-C  PCP: Obdulia Alan, APRN - CNP     Plan of care signed (Y/N):     Date of Patient follow up with Physician:      Plan of Care Report: EVAL today  POC update due: (10 visits /OR AUTH LIMITS, whichever is less)  2025                                             Medical History:  Comorbidities:  None  Relevant Medical History:                                          Precautions/ Contra-indications:           Latex allergy:  NO  Pacemaker:

## 2025-04-08 ENCOUNTER — HOSPITAL ENCOUNTER (OUTPATIENT)
Dept: PHYSICAL THERAPY | Age: 65
Setting detail: THERAPIES SERIES
Discharge: HOME | End: 2025-04-08
Payer: COMMERCIAL

## 2025-04-08 PROCEDURE — 97140 MANUAL THERAPY 1/> REGIONS: CPT | Performed by: PHYSICAL THERAPIST

## 2025-04-08 PROCEDURE — 97110 THERAPEUTIC EXERCISES: CPT | Performed by: PHYSICAL THERAPIST

## 2025-04-08 NOTE — FLOWSHEET NOTE
[facility] - Outpatient Rehabilitation and Therapy: [address], [place] [address] office: [phone] fax: [phone]    Physical Therapy: TREATMENT/PROGRESS NOTE   Patient: Ayo Hurley (64 y.o. male)   Examination Date: 2025   :  1960 MRN: [id number]   Visit #: 2   Insurance Allowable Auth Needed   75 []Yes    [x]No    Insurance: Pay[place]: [place] BCBS / Plan: [place] / Product Type: *No Product type* /   Insurance ID: [id number] - ([place] BCBS)  Secondary Insurance (if applicable):    Treatment Diagnosis:     ICD-10-CM    1. Right ankle pain, unspecified chronicity  M25.571       2. Ankle stiffness, right  M25.671          Medical Diagnosis:  S/P ORIF (open reduction internal fixation) fracture [Z98.890, Z87.81]   Referring Physician: Bryan Dennis PA-C  PCP: Obdulia Alan APRN - CNP     Plan of care signed (Y/N):     Date of Patient follow up with Physician:      Plan of Care Report: NO  POC update due: (10 visits /OR AUTH LIMITS, whichever is less)  2025                                             Medical History:  Comorbidities:  None  Relevant Medical History:                                          Precautions/ Contra-indications:           Latex allergy:  NO  Pacemaker:    NO  Contraindications for Manipulation: None and recent surgical history (relative)  Date of Surgery: 2/3/2025. Open reduction internal fixation Ankle fracture - Tri-malleolar with posterior lip repair       Red Flags:  None    Suicide Screening:   The patient did not verbalize a primary behavioral concern, suicidal ideation, suicidal intent, or demonstrate suicidal behaviors.    Preferred Language for Healthcare:   [x] English       [] other:    SUBJECTIVE EXAMINATION     Patient stated complaint: Ankle feeling better. Able to walk more without increases in pain. Pain is mostly over posterior foot and calf    EVAL: Ayo Hurley presents to PT today s/p R ankle ORIF on 
WDL

## 2025-04-10 ENCOUNTER — HOSPITAL ENCOUNTER (OUTPATIENT)
Dept: PHYSICAL THERAPY | Age: 65
Setting detail: THERAPIES SERIES
Discharge: HOME | End: 2025-04-10
Payer: COMMERCIAL

## 2025-04-10 PROCEDURE — 97140 MANUAL THERAPY 1/> REGIONS: CPT | Performed by: PHYSICAL THERAPIST

## 2025-04-10 PROCEDURE — 97110 THERAPEUTIC EXERCISES: CPT | Performed by: PHYSICAL THERAPIST

## 2025-04-10 NOTE — FLOWSHEET NOTE
re-education.    Gait Training (20683) for normalization of ambulation patterns and AD training.   Manual Therapy (18367) as indicated to include: Passive Range of Motion, Gr I-IV mobilizations, Soft Tissue Mobilization, Trigger Point Release, and Myofascial Release  Modalities as needed that may include: Cryotherapy, Electrical Stimulation, and Vasoneumatic Compression  Patient education on joint protection, postural re-education, activity modification, and progression of HEP    Plan: POC initiated as per evaluation    Electronically Signed by LUC Smith  Date: 04/10/2025   Therapist was present, directed the patient's care, made skilled judgement, and was responsible for assessment and treatment of the patient.         Note: Portions of this note have been templated and/or copied from initial evaluation, reassessments and prior notes for documentation efficiency.    Note: If patient does not return for scheduled/recommended follow up visits, this note will serve as a discharge from care along with the most recent update on progress.

## 2025-04-15 ENCOUNTER — HOSPITAL ENCOUNTER (OUTPATIENT)
Dept: PHYSICAL THERAPY | Age: 65
Setting detail: THERAPIES SERIES
Discharge: HOME | End: 2025-04-15
Payer: COMMERCIAL

## 2025-04-15 PROCEDURE — 97110 THERAPEUTIC EXERCISES: CPT | Performed by: PHYSICAL THERAPIST

## 2025-04-15 PROCEDURE — 97140 MANUAL THERAPY 1/> REGIONS: CPT | Performed by: PHYSICAL THERAPIST

## 2025-04-15 NOTE — FLOWSHEET NOTE
Bought compression socks and feels that his swelling is much lower    EVAL: Ayo Hurley presents to PT today s/p R ankle ORIF on 2/3/2025. Injury occurred while walking his dog in late January. Patient slipped on ice and reports that his ankle was pointing outward. Ankle continues to be painful over the medial, lateral, and posterior portions of the ankle which increases with ascending/descending steps, walking laterally, putting shoes on and off, and bending the ankle with transfers. Has relief with rest and ice. Patient has been weaning himself out the boot and has been FWB without boot for about 3 days now.        Test used Initial score  4/3/25 04/15/2025   Pain Summary VAS 0-3/10 3/10   Functional questionnaire FAAM ADL 46/96 (47%)  Sport 6/28 (79%)    Other:                 OBJECTIVE EXAMINATION     4/3/25  ROM/Strength: (Blank cells denote NT)      Mvmt (norm) AROM L AROM R Notes PROM L PROM R Notes     LUMBAR Flex (90)         Ext (25)         SB (25)          Rotation (30)             HIP Flex (120)          Abd (45)          ER (50)          IR (45)          Ext (20)         KNEE Flex (140)          Ext (0)           ANKLE DF (20) 10 0        PF (50) 40 20        Inversion (30) 35 22        Eversion (20) 15 10           MMT L          MMT  R Notes     LUMBAR  Flexion       Extension       Lateral flexion        Rotation          MMT L MMT R Notes       HIP  Flexion        Abduction        ER        IR        Extension      KNEE  Flexion        Extension        ANKLE  DF 5 4 Strength of all motions limited by pain     PF 5 4      Inversion 5 4      Eversion 5 4          Exercises/Interventions     Therapeutic Ex (27489)  resistance Sets/time Reps Notes/Cues/Progressions   Ankle INV/EV w band GTB 2 10    Bike warm up  5'     Soleus heel toe raise 35# 2x15     Baps board pf/df and inv/ev  2 10 each 2# weight   Leg press DHR 60# 2 15    Incline gastroc/soleus stretch  30\" 3x R    Forward step up and over 2\"

## 2025-04-17 ENCOUNTER — HOSPITAL ENCOUNTER (OUTPATIENT)
Dept: PHYSICAL THERAPY | Age: 65
Setting detail: THERAPIES SERIES
Discharge: HOME | End: 2025-04-17
Payer: COMMERCIAL

## 2025-04-17 PROCEDURE — 97140 MANUAL THERAPY 1/> REGIONS: CPT | Performed by: PHYSICAL THERAPIST

## 2025-04-17 PROCEDURE — 97110 THERAPEUTIC EXERCISES: CPT | Performed by: PHYSICAL THERAPIST

## 2025-04-17 NOTE — FLOWSHEET NOTE
[facility] - Outpatient Rehabilitation and Therapy: [address], [place] [address] office: [phone] fax: [phone]    Physical Therapy: TREATMENT/PROGRESS NOTE   Patient: Ayo Hurley (64 y.o. male)   Examination Date: 2025   :  1960 MRN: [id number]   Visit #: 5   Insurance Allowable Auth Needed   75 []Yes    [x]No    Insurance: [place] / [place] / Product Type: *No Product type* /   Insurance ID: [id number] - ([place] [employer]BS)  Secondary Insurance (if applicable):    Treatment Diagnosis:     ICD-10-CM    1. Right ankle pain, unspecified chronicity  M25.571       2. Ankle stiffness, right  M25.671          Medical Diagnosis:  S/P ORIF (open reduction internal fixation) fracture [Z98.890, Z87.81]   Referring Physician: Bryan Dennis PA-C  PCP: Obdulia Alan APRN - CNP     Plan of care signed (Y/N): Yes    Date of Patient follow up with Physician:      Plan of Care Report: NO  POC update due: (10 visits /OR AUTH LIMITS, whichever is less)  2025                                             Medical History:  Comorbidities:  None  Relevant Medical History:                                          Precautions/ Contra-indications:           Latex allergy:  NO  Pacemaker:    NO  Contraindications for Manipulation: None and recent surgical history (relative)  Date of Surgery: 2/3/2025. Open reduction internal fixation Ankle fracture - Tri-malleolar with posterior lip repair       Red Flags:  None    Suicide Screening:   The patient did not verbalize a primary behavioral concern, suicidal ideation, suicidal intent, or demonstrate suicidal behaviors.    Preferred Language for Healthcare:   [x] English       [] other:    SUBJECTIVE EXAMINATION     Patient stated complaint: Continues to feel better each day. Ankle is pretty swollen today but pain is minimal    EVAL: Ayo Hurley presents to PT today s/p R ankle ORIF on 2/3/2025. Injury occurred

## 2025-04-22 ENCOUNTER — HOSPITAL ENCOUNTER (OUTPATIENT)
Dept: PHYSICAL THERAPY | Age: 65
Setting detail: THERAPIES SERIES
Discharge: HOME | End: 2025-04-22
Payer: COMMERCIAL

## 2025-04-22 PROCEDURE — 97110 THERAPEUTIC EXERCISES: CPT | Performed by: PHYSICAL THERAPIST

## 2025-04-22 PROCEDURE — 97140 MANUAL THERAPY 1/> REGIONS: CPT | Performed by: PHYSICAL THERAPIST

## 2025-04-22 NOTE — FLOWSHEET NOTE
[facility] - Outpatient Rehabilitation and Therapy: [address], [place] [address] office: [phone] fax: [phone]    Physical Therapy: TREATMENT/PROGRESS NOTE   Patient: Ayo Hurley (64 y.o. male)   Examination Date: 2025   :  1960 MRN: [id number]   Visit #: 6   Insurance Allowable Auth Needed   75 []Yes    [x]No    Insurance: [place] / [place] / Product Type: *No Product type* /   Insurance ID: [id number] - ([place] BCBS)  Secondary Insurance (if applicable):    Treatment Diagnosis:     ICD-10-CM    1. Right ankle pain, unspecified chronicity  M25.571       2. Ankle stiffness, right  M25.671          Medical Diagnosis:  S/P ORIF (open reduction internal fixation) fracture [Z98.890, Z87.81]   Referring Physician: Bryan Dennis PA-C  PCP: Obdulia Alan APRN - CNP     Plan of care signed (Y/N): Yes    Date of Patient follow up with Physician:      Plan of Care Report: NO  POC update due: (10 visits /OR AUTH LIMITS, whichever is less)  2025                                             Medical History:  Comorbidities:  None  Relevant Medical History:                                          Precautions/ Contra-indications:           Latex allergy:  NO  Pacemaker:    NO  Contraindications for Manipulation: None and recent surgical history (relative)  Date of Surgery: 2/3/2025. Open reduction internal fixation Ankle fracture - Tri-malleolar with posterior lip repair       Red Flags:  None    Suicide Screening:   The patient did not verbalize a primary behavioral concern, suicidal ideation, suicidal intent, or demonstrate suicidal behaviors.    Preferred Language for Healthcare:   [x] English       [] other:    SUBJECTIVE EXAMINATION     Patient stated complaint: Continues to feel better each day. Ankle is quite swollen today with feelings of stiffness. Felt great after last session. Some soreness over the weekend with easter activities.

## 2025-04-24 ENCOUNTER — HOSPITAL ENCOUNTER (OUTPATIENT)
Dept: PHYSICAL THERAPY | Age: 65
Setting detail: THERAPIES SERIES
Discharge: HOME | End: 2025-04-24
Payer: COMMERCIAL

## 2025-04-24 PROCEDURE — 97110 THERAPEUTIC EXERCISES: CPT | Performed by: PHYSICAL THERAPIST

## 2025-04-24 PROCEDURE — 97140 MANUAL THERAPY 1/> REGIONS: CPT | Performed by: PHYSICAL THERAPIST

## 2025-04-24 NOTE — FLOWSHEET NOTE
on airex with finger tip support  15\" 3    NV trial side stepping w band at feet                     Manual Intervention (58946)  TIME     STM to gastroc/soleus  Calcaneal inv/ev/pf/df PROM  Posterior glide  8'                                  resistance Sets/time Reps CUES NEEDED                                      Therapeutic Activity (18370)  Sets/time                                          Modalities:    No modalities applied this session    Education/Home Exercise Program: Patient HEP program created electronically.  Refer to [employer] access code: [email or web address]      ASSESSMENT     Today's Assessment: Patient having decreased anterior ankle pain with all exercises today. Swelling is significantly decreased today compare to previous visits though still present. Education provided for importance of elevation, icing, compression, and movement throughout day to reduce swelling. Resistance and repetition increased today with no deficits in motion or form or increases in pain. Patient continues to demonstrate improvement in ambulation each visit. Demonstrates improvement in SLB and foot proprioception with SLB on airex.     Medical Necessity Documentation:  I certify that this patient meets the below criteria necessary for medical necessity for care and/or justification of therapy services:  The patient has a musculoskeletal condition(s) with a corresponding ICD-10 code that is of complexity and severity that require skilled therapeutic intervention. This has a direct and significant impact on the need for therapy and significantly impacts the rate of recovery.     Return to Play: NA    Prognosis for POC: [x] Good [] Fair  [] Poor    Patient requires continued skilled intervention: [x] Yes  [] No      CHARGE CAPTURE     PT CHARGE GRID   CPT Code (TIMED) minutes # CPT Code (UNTIMED) #     Therex (42106)  26 2  EVAL:LOW (05740 - Typically 20 minutes face-to-face)     Neuromusc. Re-ed (59820) 4   Re-Eval (30594)

## 2025-05-01 ENCOUNTER — HOSPITAL ENCOUNTER (OUTPATIENT)
Dept: PHYSICAL THERAPY | Age: 65
Setting detail: THERAPIES SERIES
Discharge: HOME | End: 2025-05-01
Payer: COMMERCIAL

## 2025-05-01 PROCEDURE — 97112 NEUROMUSCULAR REEDUCATION: CPT

## 2025-05-01 PROCEDURE — 97110 THERAPEUTIC EXERCISES: CPT

## 2025-05-01 NOTE — PLAN OF CARE
to 5/01/2025  POC update due: (10 visits /OR AUTH LIMITS, whichever is less)  5/2/2025                                             Medical History:  Comorbidities:  None  Relevant Medical History:                                          Precautions/ Contra-indications:           Latex allergy:  NO  Pacemaker:    NO  Contraindications for Manipulation: None and recent surgical history (relative)  Date of Surgery: 2/3/2025. Open reduction internal fixation Ankle fracture - Tri-malleolar with posterior lip repair       Red Flags:  None    Suicide Screening:   The patient did not verbalize a primary behavioral concern, suicidal ideation, suicidal intent, or demonstrate suicidal behaviors.    Preferred Language for Healthcare:   [x] English       [] other:    SUBJECTIVE EXAMINATION     Patient stated complaint: Ankle continues to feel great. Went for a 4 mile bike ride today with no increases in pain during or after. Feels comfortable doing yard work, chores, and walking the dog daily without increases in pain or limitation. Feels he is back to 95% of PLOF with only limitations being some plantar fascia pain and tightness in the achilles.     EVAL: Ayo Hurley presents to PT today s/p R ankle ORIF on 2/3/2025. Injury occurred while walking his dog in late January. Patient slipped on ice and reports that his ankle was pointing outward. Ankle continues to be painful over the medial, lateral, and posterior portions of the ankle which increases with ascending/descending steps, walking laterally, putting shoes on and off, and bending the ankle with transfers. Has relief with rest and ice. Patient has been weaning himself out the boot and has been FWB without boot for about 3 days now.        Test used Initial score  4/3/25 05/01/2025   Pain Summary VAS 0-3/10 0/10   Functional questionnaire FAAM ADL 46/84 (45%)  Sport 6/28 (79%) ADL 80/84 (5%)  Sport 24/28 (15%)   AROM PF 20 55    DF 0 12    Inv 22 35    EV 10 15

## 2025-05-06 ENCOUNTER — APPOINTMENT (OUTPATIENT)
Dept: PHYSICAL THERAPY | Age: 65
End: 2025-05-06
Payer: COMMERCIAL

## 2025-05-08 ENCOUNTER — PATIENT MESSAGE (OUTPATIENT)
Dept: FAMILY MEDICINE CLINIC | Age: 65
End: 2025-05-08

## 2025-05-09 NOTE — TELEPHONE ENCOUNTER
Called and informed Ayo. She stated that he is feeling better today. I did advise him to go to the ED if he starts having these sx again

## 2025-05-13 ENCOUNTER — APPOINTMENT (OUTPATIENT)
Dept: PHYSICAL THERAPY | Age: 65
End: 2025-05-13
Payer: COMMERCIAL

## 2025-05-13 ENCOUNTER — OFFICE VISIT (OUTPATIENT)
Dept: ORTHOPEDIC SURGERY | Age: 65
End: 2025-05-13
Payer: COMMERCIAL

## 2025-05-13 VITALS — WEIGHT: 222 LBS | BODY MASS INDEX: 29.42 KG/M2 | HEIGHT: 73 IN

## 2025-05-13 DIAGNOSIS — Z87.81 S/P ORIF (OPEN REDUCTION INTERNAL FIXATION) FRACTURE: Primary | ICD-10-CM

## 2025-05-13 DIAGNOSIS — Z98.890 S/P ORIF (OPEN REDUCTION INTERNAL FIXATION) FRACTURE: Primary | ICD-10-CM

## 2025-05-13 PROCEDURE — 1123F ACP DISCUSS/DSCN MKR DOCD: CPT | Performed by: PHYSICIAN ASSISTANT

## 2025-05-13 PROCEDURE — 99213 OFFICE O/P EST LOW 20 MIN: CPT | Performed by: PHYSICIAN ASSISTANT

## 2025-05-13 NOTE — PROGRESS NOTES
options.     In terms of treatment, I recommended continuing with rest, icing, avoidance of painful activities, NSAIDs or pain meds as tolerated, and physical therapy.     We discussed surgical options as well, should conservative measures fail.     No orders of the defined types were placed in this encounter.     Electronically signed by Bryan Dennis PA-C on 5/13/2025 at 2:52 PM  This dictation was generated by voice recognition computer software.  Although all attempts are made to edit the dictation for accuracy, there may be errors in the transcription that are not intended.

## 2025-05-20 ENCOUNTER — APPOINTMENT (OUTPATIENT)
Dept: PHYSICAL THERAPY | Age: 65
End: 2025-05-20
Payer: COMMERCIAL

## 2025-07-03 DIAGNOSIS — H91.93 BILATERAL HEARING LOSS, UNSPECIFIED HEARING LOSS TYPE: Primary | ICD-10-CM

## 2025-07-29 ENCOUNTER — OFFICE VISIT (OUTPATIENT)
Dept: ENT CLINIC | Age: 65
End: 2025-07-29
Payer: COMMERCIAL

## 2025-07-29 ENCOUNTER — PROCEDURE VISIT (OUTPATIENT)
Dept: AUDIOLOGY | Age: 65
End: 2025-07-29
Payer: COMMERCIAL

## 2025-07-29 VITALS
HEIGHT: 73 IN | SYSTOLIC BLOOD PRESSURE: 127 MMHG | HEART RATE: 73 BPM | BODY MASS INDEX: 31.14 KG/M2 | DIASTOLIC BLOOD PRESSURE: 83 MMHG | WEIGHT: 235 LBS

## 2025-07-29 DIAGNOSIS — H90.A22 SENSORINEURAL HEARING LOSS (SNHL) OF LEFT EAR WITH RESTRICTED HEARING OF RIGHT EAR: ICD-10-CM

## 2025-07-29 DIAGNOSIS — R42 DIZZINESS AND GIDDINESS: ICD-10-CM

## 2025-07-29 DIAGNOSIS — H90.A31 MIXED CONDUCTIVE AND SENSORINEURAL HEARING LOSS OF RIGHT EAR WITH RESTRICTED HEARING OF LEFT EAR: Primary | ICD-10-CM

## 2025-07-29 DIAGNOSIS — R26.89 IMBALANCE: Primary | ICD-10-CM

## 2025-07-29 DIAGNOSIS — H90.3 SENSORINEURAL HEARING LOSS (SNHL) OF BOTH EARS: ICD-10-CM

## 2025-07-29 PROCEDURE — 92557 COMPREHENSIVE HEARING TEST: CPT | Performed by: AUDIOLOGIST

## 2025-07-29 PROCEDURE — 1123F ACP DISCUSS/DSCN MKR DOCD: CPT | Performed by: OTOLARYNGOLOGY

## 2025-07-29 PROCEDURE — 99204 OFFICE O/P NEW MOD 45 MIN: CPT | Performed by: OTOLARYNGOLOGY

## 2025-07-29 PROCEDURE — 92567 TYMPANOMETRY: CPT | Performed by: AUDIOLOGIST

## 2025-07-29 ASSESSMENT — ENCOUNTER SYMPTOMS
SORE THROAT: 0
SINUS PRESSURE: 0
SHORTNESS OF BREATH: 0
VOICE CHANGE: 0
FACIAL SWELLING: 0
EYE ITCHING: 0
TROUBLE SWALLOWING: 0
COUGH: 0
APNEA: 0

## 2025-07-29 NOTE — PROGRESS NOTES
[facility]  [address]  [place] [address]  P: [phone]       Patient     Ayo Hurley  1960    ChiefComplaint     Chief Complaint   Patient presents with    Hearing Problem     Bilateral hearing loss    Dizziness     Dizzy spells       History of Present Illness     Theodore is a 64-year-old male here today for evaluation of bilateral hearing loss that has been progressive over several years.  Also reports longstanding imbalance will vary on a daily basis some days is not severe other days he is losing balance to the left versus right.  Denies room spinning vertigo.  No preceding head trauma.  Currently wears over-the-counter amplifiers.    Past Medical History     Past Medical History:   Diagnosis Date    Allergic rhinitis     spring    Arthritis     otilia knees    Chronic back pain     Hx HNP    Closed trimalleolar fracture of right ankle     Diabetes mellitus ([facility]) 09/19/2022    type 2    Hearing loss 1989    WEARS HEARING AIDS    Hernia, umbilical     Hx of blood clots     Pulmonary embolism ([facility]) 09/19/2022    Type 2 diabetes mellitus without complication ([facility]) 9/19/22    Wears contact lenses     AND WEARS GLASSES       Past Surgical History     Past Surgical History:   Procedure Laterality Date    ANKLE FRACTURE SURGERY Right 2/3/2025    OPEN REDUCTION INTERNAL FIXATION RIGHT ANKLE, TRIMALLEOLAR WITH  POSTERIOR LIP performed by Olvin Salter MD at [facility] OR    COLONOSCOPY  6/12/2013    polyp,divert    HAND SURGERY Left 2012    MASTECTOMY Right     for lumps    MASTECTOMY Left        Family History     Family History   Problem Relation Age of Onset    Diabetes Mother     Hearing Loss Mother     Vision Loss Mother     Cancer Father         lung CA    Heart Disease Father         CABG    Hearing Loss Father     Vision Loss Father     Stroke Paternal Aunt        Social History     Social History     Tobacco Use    Smoking status: Never    Smokeless tobacco: Never   Vaping

## 2025-07-29 NOTE — PROGRESS NOTES
Ayo Hurley   1960, 64 y.o. male   [id number]       Referring Provider: Alem Garrett DO  Referral Type: In an order in Epic    Reason for Visit: Evaluation of suspected change in hearing, tinnitus, or balance.    ADULT AUDIOLOGIC EVALUATION      Ayo Hurley is a 64 y.o. male seen today, 7/29/2025 , for a recheck audiologic evaluation.  Patient was seen by Alem Garrett DO following today's evaluation. Patient was alone.    AUDIOLOGIC AND OTHER PERTINENT MEDICAL HISTORY:      Ayo Hurley noted imbalance, history of occupational noise exposure, and family history of hearing loss.  Patient reports known hearing loss with the use of over the counter hearing aids ([facility]ren).  He has experienced an imbalance sensation of unknown specific triggers.  However he noticed once when laying on his right side or turning quickly to the left.  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  Patient has a family history of hearing loss occurring later in life.    Ayo Hurley denied otalgia, aural fullness, tinnitus, history of head trauma, and history of ear surgery.    Date: 7/29/2025     IMPRESSIONS:      Normal middle ear pressure and compliance, bilaterally.  Abnormal hearing sensitivity, bilaterally, which can affect every day listening needs.  Word understanding was very poor in the right ear and fair in the left ear presented at elevated sensation levels.  Hearing aids are recommended at this time.  Follow medical recommendations of Alem Garrett DO.     ASSESSMENT AND FINDINGS:     Otoscopy revealed: Clear ear canals bilaterally    RIGHT EAR:  Hearing Sensitivity:Mild to a severe mixed hearing loss from 250-8000 Hz  Speech Recognition Threshold: 40 dB HL  Word Recognition:Very Poor (52%), based on NU-6 25-word list at 90 dBHL with 60 dBHL of masking using recorded speech stimuli.    Tympanometry: Normal peak pressure and compliance, Type A tympanogram,

## 2025-08-12 ENCOUNTER — HOSPITAL ENCOUNTER (OUTPATIENT)
Dept: PHYSICAL THERAPY | Age: 65
Setting detail: THERAPIES SERIES
Discharge: HOME | End: 2025-08-12
Attending: OTOLARYNGOLOGY
Payer: COMMERCIAL

## 2025-08-12 DIAGNOSIS — R26.89 IMBALANCE: Primary | ICD-10-CM

## 2025-08-12 PROCEDURE — 97162 PT EVAL MOD COMPLEX 30 MIN: CPT

## 2025-08-12 PROCEDURE — 97112 NEUROMUSCULAR REEDUCATION: CPT

## 2025-08-18 ENCOUNTER — PROCEDURE VISIT (OUTPATIENT)
Dept: AUDIOLOGY | Age: 65
End: 2025-08-18

## 2025-08-18 DIAGNOSIS — H90.A22 SENSORINEURAL HEARING LOSS (SNHL) OF LEFT EAR WITH RESTRICTED HEARING OF RIGHT EAR: ICD-10-CM

## 2025-08-18 DIAGNOSIS — H90.A31 MIXED CONDUCTIVE AND SENSORINEURAL HEARING LOSS OF RIGHT EAR WITH RESTRICTED HEARING OF LEFT EAR: Primary | ICD-10-CM

## 2025-08-21 ENCOUNTER — HOSPITAL ENCOUNTER (OUTPATIENT)
Dept: PHYSICAL THERAPY | Age: 65
Setting detail: THERAPIES SERIES
Discharge: HOME | End: 2025-08-21
Attending: OTOLARYNGOLOGY
Payer: COMMERCIAL

## 2025-08-21 PROCEDURE — 97112 NEUROMUSCULAR REEDUCATION: CPT

## 2025-08-25 ENCOUNTER — PATIENT MESSAGE (OUTPATIENT)
Dept: FAMILY MEDICINE CLINIC | Age: 65
End: 2025-08-25

## 2025-08-28 ENCOUNTER — APPOINTMENT (OUTPATIENT)
Dept: PHYSICAL THERAPY | Age: 65
End: 2025-08-28
Attending: OTOLARYNGOLOGY
Payer: COMMERCIAL

## 2025-09-04 ENCOUNTER — HOSPITAL ENCOUNTER (OUTPATIENT)
Dept: PHYSICAL THERAPY | Age: 65
Setting detail: THERAPIES SERIES
Discharge: HOME | End: 2025-09-04
Attending: OTOLARYNGOLOGY
Payer: COMMERCIAL

## 2025-09-04 PROCEDURE — 97112 NEUROMUSCULAR REEDUCATION: CPT

## (undated) DEVICE — Device

## (undated) DEVICE — ZIMMER® STERILE DISPOSABLE TOURNIQUET CUFF WITH PLC, DUAL PORT, SINGLE BLADDER, 30 IN. (76 CM)

## (undated) DEVICE — SUTURE VICRYL SZ 3-0 L18IN ABSRB UD L26MM SH 1/2 CIR J864D

## (undated) DEVICE — DRESSING,GAUZE,XEROFORM,CURAD,5"X9",ST: Brand: CURAD

## (undated) DEVICE — GAUZE,SPONGE,4"X4",8PLY,STRL,LF,10/TRAY: Brand: MEDLINE

## (undated) DEVICE — SUTURE VICRYL SZ 0 L36IN ABSRB UD CT-1 L36MM 1/2 CIR TAPR PNT VCP946H

## (undated) DEVICE — GLOVE ORTHO 7 1/2   MSG9475

## (undated) DEVICE — SOLUTION IRRIG 1000ML 0.9% SOD CHL USP POUR PLAS BTL

## (undated) DEVICE — BANDAGE COMPR W6INXL10YD ST M E WHITE/BEIGE

## (undated) DEVICE — FRAME FIXATOR DIAM.4.2MM, AO FITTING: Brand: AXSOS

## (undated) DEVICE — SUTURE NONABSORBABLE MONOFILAMENT 3-0 PS-1 18 IN BLK ETHILON 1663H

## (undated) DEVICE — C-ARMOR C-ARM EQUIPMENT COVERS CLEAR STERILE UNIVERSAL FIT 12 PER CASE: Brand: C-ARMOR

## (undated) DEVICE — PADDING CAST W4INXL4YD ST COT RAYON MICROPLEATED HIGHLY

## (undated) DEVICE — SPLINT ORTH DBL SIDE 30X4 IN PRECUT PADDED FIBERGLASS LF

## (undated) DEVICE — LOWER EXTREMITY: Brand: MEDLINE INDUSTRIES, INC.

## (undated) DEVICE — GOWN SIRUS NONREIN XL W/TWL: Brand: MEDLINE INDUSTRIES, INC.

## (undated) DEVICE — PAD,ABDOMINAL,8"X10",ST,LF: Brand: MEDLINE

## (undated) DEVICE — PLATE TACK

## (undated) DEVICE — GLOVE SURG SZ 8 L12IN FNGR THK79MIL GRN LTX FREE

## (undated) DEVICE — SPLINT THMB W4XL30IN FBRGLS PD PRECUT LTWT DURABLE FAST SET

## (undated) DEVICE — SUTURE VICRYL SZ 2-0 L18IN ABSRB UD CT-1 L36MM 1/2 CIR J839D

## (undated) DEVICE — DRAPE C ARM W46XL120IN XLN